# Patient Record
Sex: FEMALE | Race: WHITE | NOT HISPANIC OR LATINO | Employment: FULL TIME | URBAN - METROPOLITAN AREA
[De-identification: names, ages, dates, MRNs, and addresses within clinical notes are randomized per-mention and may not be internally consistent; named-entity substitution may affect disease eponyms.]

---

## 2018-01-09 ENCOUNTER — APPOINTMENT (OUTPATIENT)
Dept: WOMENS IMAGING | Facility: HOSPITAL | Age: 48
End: 2018-01-09

## 2018-01-09 PROCEDURE — 77063 BREAST TOMOSYNTHESIS BI: CPT | Performed by: RADIOLOGY

## 2018-01-09 PROCEDURE — 77067 SCR MAMMO BI INCL CAD: CPT | Performed by: RADIOLOGY

## 2018-06-18 ENCOUNTER — ANESTHESIA EVENT (OUTPATIENT)
Dept: PERIOP | Facility: HOSPITAL | Age: 48
End: 2018-06-18

## 2018-06-18 ENCOUNTER — ANESTHESIA (OUTPATIENT)
Dept: PERIOP | Facility: HOSPITAL | Age: 48
End: 2018-06-18

## 2018-06-18 ENCOUNTER — APPOINTMENT (OUTPATIENT)
Dept: CT IMAGING | Facility: HOSPITAL | Age: 48
End: 2018-06-18

## 2018-06-18 ENCOUNTER — HOSPITAL ENCOUNTER (INPATIENT)
Facility: HOSPITAL | Age: 48
LOS: 2 days | Discharge: HOME | End: 2018-06-20
Attending: EMERGENCY MEDICINE | Admitting: COLON & RECTAL SURGERY

## 2018-06-18 DIAGNOSIS — K61.1 PERIRECTAL ABSCESS: Primary | ICD-10-CM

## 2018-06-18 LAB
ANION GAP SERPL CALCULATED.3IONS-SCNC: 13.7 MMOL/L
BASOPHILS # BLD AUTO: 0.04 10*3/MM3 (ref 0–0.2)
BASOPHILS NFR BLD AUTO: 0.2 % (ref 0–1.5)
BUN BLD-MCNC: 16 MG/DL (ref 6–20)
BUN/CREAT SERPL: 19.5 (ref 7–25)
CALCIUM SPEC-SCNC: 9.4 MG/DL (ref 8.6–10.5)
CHLORIDE SERPL-SCNC: 98 MMOL/L (ref 98–107)
CO2 SERPL-SCNC: 26.3 MMOL/L (ref 22–29)
CREAT BLD-MCNC: 0.82 MG/DL (ref 0.57–1)
DEPRECATED RDW RBC AUTO: 41.9 FL (ref 37–54)
EOSINOPHIL # BLD AUTO: 0.03 10*3/MM3 (ref 0–0.7)
EOSINOPHIL NFR BLD AUTO: 0.2 % (ref 0.3–6.2)
ERYTHROCYTE [DISTWIDTH] IN BLOOD BY AUTOMATED COUNT: 12.3 % (ref 11.7–13)
GFR SERPL CREATININE-BSD FRML MDRD: 74 ML/MIN/1.73
GLUCOSE BLD-MCNC: 119 MG/DL (ref 65–99)
HCG SERPL QL: NEGATIVE
HCT VFR BLD AUTO: 40.5 % (ref 35.6–45.5)
HGB BLD-MCNC: 13.6 G/DL (ref 11.9–15.5)
IMM GRANULOCYTES # BLD: 0.07 10*3/MM3 (ref 0–0.03)
IMM GRANULOCYTES NFR BLD: 0.4 % (ref 0–0.5)
LYMPHOCYTES # BLD AUTO: 1.74 10*3/MM3 (ref 0.9–4.8)
LYMPHOCYTES NFR BLD AUTO: 9.8 % (ref 19.6–45.3)
MCH RBC QN AUTO: 31.1 PG (ref 26.9–32)
MCHC RBC AUTO-ENTMCNC: 33.6 G/DL (ref 32.4–36.3)
MCV RBC AUTO: 92.7 FL (ref 80.5–98.2)
MONOCYTES # BLD AUTO: 1.26 10*3/MM3 (ref 0.2–1.2)
MONOCYTES NFR BLD AUTO: 7.1 % (ref 5–12)
NEUTROPHILS # BLD AUTO: 14.72 10*3/MM3 (ref 1.9–8.1)
NEUTROPHILS NFR BLD AUTO: 82.7 % (ref 42.7–76)
PLATELET # BLD AUTO: 352 10*3/MM3 (ref 140–500)
PMV BLD AUTO: 9.6 FL (ref 6–12)
POTASSIUM BLD-SCNC: 3.7 MMOL/L (ref 3.5–5.2)
RBC # BLD AUTO: 4.37 10*6/MM3 (ref 3.9–5.2)
SODIUM BLD-SCNC: 138 MMOL/L (ref 136–145)
WBC NRBC COR # BLD: 17.79 10*3/MM3 (ref 4.5–10.7)

## 2018-06-18 PROCEDURE — 74177 CT ABD & PELVIS W/CONTRAST: CPT

## 2018-06-18 PROCEDURE — 25010000002 DEXAMETHASONE PER 1 MG: Performed by: ANESTHESIOLOGY

## 2018-06-18 PROCEDURE — 0D9P00Z DRAINAGE OF RECTUM WITH DRAINAGE DEVICE, OPEN APPROACH: ICD-10-PCS | Performed by: COLON & RECTAL SURGERY

## 2018-06-18 PROCEDURE — 87205 SMEAR GRAM STAIN: CPT | Performed by: COLON & RECTAL SURGERY

## 2018-06-18 PROCEDURE — 25010000002 CEFEPIME: Performed by: EMERGENCY MEDICINE

## 2018-06-18 PROCEDURE — 25010000002 ONDANSETRON PER 1 MG: Performed by: ANESTHESIOLOGY

## 2018-06-18 PROCEDURE — C2627 CATH, SUPRAPUBIC/CYSTOSCOPIC: HCPCS | Performed by: COLON & RECTAL SURGERY

## 2018-06-18 PROCEDURE — 25010000002 PHENYLEPHRINE PER 1 ML: Performed by: ANESTHESIOLOGY

## 2018-06-18 PROCEDURE — 87075 CULTR BACTERIA EXCEPT BLOOD: CPT | Performed by: COLON & RECTAL SURGERY

## 2018-06-18 PROCEDURE — 25010000002 HYDROMORPHONE PER 4 MG: Performed by: ANESTHESIOLOGY

## 2018-06-18 PROCEDURE — 80048 BASIC METABOLIC PNL TOTAL CA: CPT | Performed by: EMERGENCY MEDICINE

## 2018-06-18 PROCEDURE — 25010000002 PROPOFOL 10 MG/ML EMULSION: Performed by: ANESTHESIOLOGY

## 2018-06-18 PROCEDURE — 25010000002 FENTANYL CITRATE (PF) 100 MCG/2ML SOLUTION: Performed by: ANESTHESIOLOGY

## 2018-06-18 PROCEDURE — 99253 IP/OBS CNSLTJ NEW/EST LOW 45: CPT | Performed by: COLON & RECTAL SURGERY

## 2018-06-18 PROCEDURE — 25010000002 MIDAZOLAM PER 1 MG: Performed by: ANESTHESIOLOGY

## 2018-06-18 PROCEDURE — 85025 COMPLETE CBC W/AUTO DIFF WBC: CPT | Performed by: EMERGENCY MEDICINE

## 2018-06-18 PROCEDURE — 99283 EMERGENCY DEPT VISIT LOW MDM: CPT

## 2018-06-18 PROCEDURE — 84703 CHORIONIC GONADOTROPIN ASSAY: CPT | Performed by: EMERGENCY MEDICINE

## 2018-06-18 PROCEDURE — 87185 SC STD ENZYME DETCJ PER NZM: CPT | Performed by: COLON & RECTAL SURGERY

## 2018-06-18 PROCEDURE — 46040 I&D ISCHIORCT&/PERIRCT ABSC: CPT | Performed by: COLON & RECTAL SURGERY

## 2018-06-18 PROCEDURE — 87076 CULTURE ANAEROBE IDENT EACH: CPT | Performed by: COLON & RECTAL SURGERY

## 2018-06-18 PROCEDURE — 25010000002 IOPAMIDOL 61 % SOLUTION: Performed by: EMERGENCY MEDICINE

## 2018-06-18 PROCEDURE — 87147 CULTURE TYPE IMMUNOLOGIC: CPT | Performed by: COLON & RECTAL SURGERY

## 2018-06-18 PROCEDURE — 87070 CULTURE OTHR SPECIMN AEROBIC: CPT | Performed by: COLON & RECTAL SURGERY

## 2018-06-18 PROCEDURE — 87186 SC STD MICRODIL/AGAR DIL: CPT | Performed by: COLON & RECTAL SURGERY

## 2018-06-18 PROCEDURE — 25010000002 VANCOMYCIN 10 G RECONSTITUTED SOLUTION: Performed by: EMERGENCY MEDICINE

## 2018-06-18 RX ORDER — DEXAMETHASONE SODIUM PHOSPHATE 10 MG/ML
INJECTION INTRAMUSCULAR; INTRAVENOUS AS NEEDED
Status: DISCONTINUED | OUTPATIENT
Start: 2018-06-18 | End: 2018-06-18 | Stop reason: SURG

## 2018-06-18 RX ORDER — NALOXONE HCL 0.4 MG/ML
0.2 VIAL (ML) INJECTION AS NEEDED
Status: DISCONTINUED | OUTPATIENT
Start: 2018-06-18 | End: 2018-06-18

## 2018-06-18 RX ORDER — ACETAMINOPHEN 325 MG/1
650 TABLET ORAL EVERY 6 HOURS
Status: DISCONTINUED | OUTPATIENT
Start: 2018-06-18 | End: 2018-06-20 | Stop reason: HOSPADM

## 2018-06-18 RX ORDER — LIDOCAINE HYDROCHLORIDE 20 MG/ML
INJECTION, SOLUTION INFILTRATION; PERINEURAL AS NEEDED
Status: DISCONTINUED | OUTPATIENT
Start: 2018-06-18 | End: 2018-06-18 | Stop reason: SURG

## 2018-06-18 RX ORDER — HYDROCODONE BITARTRATE AND ACETAMINOPHEN 7.5; 325 MG/1; MG/1
1 TABLET ORAL ONCE AS NEEDED
Status: DISCONTINUED | OUTPATIENT
Start: 2018-06-18 | End: 2018-06-18

## 2018-06-18 RX ORDER — FENTANYL CITRATE 50 UG/ML
50 INJECTION, SOLUTION INTRAMUSCULAR; INTRAVENOUS
Status: DISCONTINUED | OUTPATIENT
Start: 2018-06-18 | End: 2018-06-18

## 2018-06-18 RX ORDER — FLUTICASONE PROPIONATE 50 MCG
2 SPRAY, SUSPENSION (ML) NASAL DAILY
COMMUNITY

## 2018-06-18 RX ORDER — EPHEDRINE SULFATE 50 MG/ML
5 INJECTION, SOLUTION INTRAVENOUS ONCE AS NEEDED
Status: DISCONTINUED | OUTPATIENT
Start: 2018-06-18 | End: 2018-06-18

## 2018-06-18 RX ORDER — ONDANSETRON 2 MG/ML
INJECTION INTRAMUSCULAR; INTRAVENOUS AS NEEDED
Status: DISCONTINUED | OUTPATIENT
Start: 2018-06-18 | End: 2018-06-18 | Stop reason: SURG

## 2018-06-18 RX ORDER — HYDROMORPHONE HYDROCHLORIDE 1 MG/ML
0.5 INJECTION, SOLUTION INTRAMUSCULAR; INTRAVENOUS; SUBCUTANEOUS
Status: DISCONTINUED | OUTPATIENT
Start: 2018-06-18 | End: 2018-06-18

## 2018-06-18 RX ORDER — CELECOXIB 200 MG/1
200 CAPSULE ORAL EVERY 12 HOURS SCHEDULED
Status: DISCONTINUED | OUTPATIENT
Start: 2018-06-18 | End: 2018-06-20 | Stop reason: HOSPADM

## 2018-06-18 RX ORDER — PROMETHAZINE HYDROCHLORIDE 25 MG/1
25 TABLET ORAL ONCE AS NEEDED
Status: DISCONTINUED | OUTPATIENT
Start: 2018-06-18 | End: 2018-06-18

## 2018-06-18 RX ORDER — PROMETHAZINE HYDROCHLORIDE 25 MG/1
25 SUPPOSITORY RECTAL ONCE AS NEEDED
Status: DISCONTINUED | OUTPATIENT
Start: 2018-06-18 | End: 2018-06-18

## 2018-06-18 RX ORDER — FENTANYL CITRATE 50 UG/ML
50 INJECTION, SOLUTION INTRAMUSCULAR; INTRAVENOUS
Status: DISCONTINUED | OUTPATIENT
Start: 2018-06-18 | End: 2018-06-18 | Stop reason: HOSPADM

## 2018-06-18 RX ORDER — OXYCODONE AND ACETAMINOPHEN 7.5; 325 MG/1; MG/1
1 TABLET ORAL ONCE AS NEEDED
Status: DISCONTINUED | OUTPATIENT
Start: 2018-06-18 | End: 2018-06-18

## 2018-06-18 RX ORDER — LABETALOL HYDROCHLORIDE 5 MG/ML
5 INJECTION, SOLUTION INTRAVENOUS
Status: DISCONTINUED | OUTPATIENT
Start: 2018-06-18 | End: 2018-06-18

## 2018-06-18 RX ORDER — PROMETHAZINE HYDROCHLORIDE 25 MG/ML
12.5 INJECTION, SOLUTION INTRAMUSCULAR; INTRAVENOUS ONCE AS NEEDED
Status: DISCONTINUED | OUTPATIENT
Start: 2018-06-18 | End: 2018-06-18

## 2018-06-18 RX ORDER — ROCURONIUM BROMIDE 10 MG/ML
INJECTION, SOLUTION INTRAVENOUS AS NEEDED
Status: DISCONTINUED | OUTPATIENT
Start: 2018-06-18 | End: 2018-06-18 | Stop reason: SURG

## 2018-06-18 RX ORDER — MONTELUKAST SODIUM 10 MG/1
10 TABLET ORAL EVERY MORNING
COMMUNITY

## 2018-06-18 RX ORDER — PAROXETINE HYDROCHLORIDE 20 MG/1
20 TABLET, FILM COATED ORAL EVERY MORNING
Status: DISCONTINUED | OUTPATIENT
Start: 2018-06-19 | End: 2018-06-20 | Stop reason: HOSPADM

## 2018-06-18 RX ORDER — MELOXICAM 15 MG/1
15 TABLET ORAL DAILY
COMMUNITY
End: 2018-10-21

## 2018-06-18 RX ORDER — SODIUM CHLORIDE 0.9 % (FLUSH) 0.9 %
1-10 SYRINGE (ML) INJECTION AS NEEDED
Status: DISCONTINUED | OUTPATIENT
Start: 2018-06-18 | End: 2018-06-18 | Stop reason: HOSPADM

## 2018-06-18 RX ORDER — MIDAZOLAM HYDROCHLORIDE 1 MG/ML
1 INJECTION INTRAMUSCULAR; INTRAVENOUS
Status: DISCONTINUED | OUTPATIENT
Start: 2018-06-18 | End: 2018-06-18 | Stop reason: HOSPADM

## 2018-06-18 RX ORDER — LISINOPRIL 20 MG/1
10 TABLET ORAL DAILY
Status: DISCONTINUED | OUTPATIENT
Start: 2018-06-19 | End: 2018-06-20 | Stop reason: HOSPADM

## 2018-06-18 RX ORDER — PROPOFOL 10 MG/ML
VIAL (ML) INTRAVENOUS AS NEEDED
Status: DISCONTINUED | OUTPATIENT
Start: 2018-06-18 | End: 2018-06-18 | Stop reason: SURG

## 2018-06-18 RX ORDER — PROMETHAZINE HYDROCHLORIDE 25 MG/1
12.5 TABLET ORAL ONCE AS NEEDED
Status: DISCONTINUED | OUTPATIENT
Start: 2018-06-18 | End: 2018-06-18

## 2018-06-18 RX ORDER — SODIUM CHLORIDE 0.9 % (FLUSH) 0.9 %
10 SYRINGE (ML) INJECTION AS NEEDED
Status: DISCONTINUED | OUTPATIENT
Start: 2018-06-18 | End: 2018-06-18

## 2018-06-18 RX ORDER — FLUMAZENIL 0.1 MG/ML
0.2 INJECTION INTRAVENOUS AS NEEDED
Status: DISCONTINUED | OUTPATIENT
Start: 2018-06-18 | End: 2018-06-18

## 2018-06-18 RX ORDER — LISINOPRIL 20 MG/1
20 TABLET ORAL DAILY
COMMUNITY
End: 2018-06-22 | Stop reason: DRUGHIGH

## 2018-06-18 RX ORDER — MIDAZOLAM HYDROCHLORIDE 1 MG/ML
2 INJECTION INTRAMUSCULAR; INTRAVENOUS
Status: DISCONTINUED | OUTPATIENT
Start: 2018-06-18 | End: 2018-06-18 | Stop reason: HOSPADM

## 2018-06-18 RX ORDER — SODIUM CHLORIDE, SODIUM LACTATE, POTASSIUM CHLORIDE, CALCIUM CHLORIDE 600; 310; 30; 20 MG/100ML; MG/100ML; MG/100ML; MG/100ML
9 INJECTION, SOLUTION INTRAVENOUS CONTINUOUS
Status: DISCONTINUED | OUTPATIENT
Start: 2018-06-18 | End: 2018-06-18

## 2018-06-18 RX ORDER — ONDANSETRON 2 MG/ML
4 INJECTION INTRAMUSCULAR; INTRAVENOUS EVERY 6 HOURS PRN
Status: DISCONTINUED | OUTPATIENT
Start: 2018-06-18 | End: 2018-06-20 | Stop reason: HOSPADM

## 2018-06-18 RX ORDER — OXYCODONE HYDROCHLORIDE 5 MG/1
10 TABLET ORAL EVERY 4 HOURS PRN
Status: DISCONTINUED | OUTPATIENT
Start: 2018-06-18 | End: 2018-06-20 | Stop reason: HOSPADM

## 2018-06-18 RX ORDER — MONTELUKAST SODIUM 10 MG/1
10 TABLET ORAL EVERY MORNING
Status: DISCONTINUED | OUTPATIENT
Start: 2018-06-19 | End: 2018-06-20 | Stop reason: HOSPADM

## 2018-06-18 RX ORDER — FEXOFENADINE HCL AND PSEUDOEPHEDRINE HCI 180; 240 MG/1; MG/1
1 TABLET, EXTENDED RELEASE ORAL DAILY
COMMUNITY
End: 2018-07-26

## 2018-06-18 RX ORDER — DIPHENHYDRAMINE HYDROCHLORIDE 50 MG/ML
12.5 INJECTION INTRAMUSCULAR; INTRAVENOUS
Status: DISCONTINUED | OUTPATIENT
Start: 2018-06-18 | End: 2018-06-18

## 2018-06-18 RX ORDER — MAGNESIUM HYDROXIDE 1200 MG/15ML
LIQUID ORAL AS NEEDED
Status: DISCONTINUED | OUTPATIENT
Start: 2018-06-18 | End: 2018-06-18 | Stop reason: HOSPADM

## 2018-06-18 RX ORDER — FAMOTIDINE 10 MG/ML
20 INJECTION, SOLUTION INTRAVENOUS ONCE
Status: COMPLETED | OUTPATIENT
Start: 2018-06-18 | End: 2018-06-18

## 2018-06-18 RX ORDER — NITROGLYCERIN 0.4 MG/1
0.4 TABLET SUBLINGUAL
Status: DISCONTINUED | OUTPATIENT
Start: 2018-06-18 | End: 2018-06-20 | Stop reason: HOSPADM

## 2018-06-18 RX ORDER — SODIUM CHLORIDE, SODIUM LACTATE, POTASSIUM CHLORIDE, CALCIUM CHLORIDE 600; 310; 30; 20 MG/100ML; MG/100ML; MG/100ML; MG/100ML
50 INJECTION, SOLUTION INTRAVENOUS CONTINUOUS
Status: DISCONTINUED | OUTPATIENT
Start: 2018-06-18 | End: 2018-06-20 | Stop reason: HOSPADM

## 2018-06-18 RX ORDER — LIDOCAINE HYDROCHLORIDE 10 MG/ML
0.5 INJECTION, SOLUTION EPIDURAL; INFILTRATION; INTRACAUDAL; PERINEURAL ONCE AS NEEDED
Status: DISCONTINUED | OUTPATIENT
Start: 2018-06-18 | End: 2018-06-18 | Stop reason: HOSPADM

## 2018-06-18 RX ORDER — ONDANSETRON 2 MG/ML
4 INJECTION INTRAMUSCULAR; INTRAVENOUS ONCE AS NEEDED
Status: DISCONTINUED | OUTPATIENT
Start: 2018-06-18 | End: 2018-06-18

## 2018-06-18 RX ORDER — PAROXETINE HYDROCHLORIDE 20 MG/1
20 TABLET, FILM COATED ORAL EVERY MORNING
COMMUNITY

## 2018-06-18 RX ORDER — LIDOCAINE HYDROCHLORIDE 40 MG/ML
SOLUTION TOPICAL AS NEEDED
Status: DISCONTINUED | OUTPATIENT
Start: 2018-06-18 | End: 2018-06-18 | Stop reason: SURG

## 2018-06-18 RX ORDER — OXYCODONE HYDROCHLORIDE 5 MG/1
5 TABLET ORAL EVERY 4 HOURS PRN
Status: DISCONTINUED | OUTPATIENT
Start: 2018-06-18 | End: 2018-06-20 | Stop reason: HOSPADM

## 2018-06-18 RX ORDER — NALOXONE HCL 0.4 MG/ML
0.4 VIAL (ML) INJECTION
Status: DISCONTINUED | OUTPATIENT
Start: 2018-06-18 | End: 2018-06-20 | Stop reason: HOSPADM

## 2018-06-18 RX ORDER — ACETAMINOPHEN 650 MG
TABLET, EXTENDED RELEASE ORAL AS NEEDED
Status: DISCONTINUED | OUTPATIENT
Start: 2018-06-18 | End: 2018-06-18 | Stop reason: HOSPADM

## 2018-06-18 RX ADMIN — HYDROMORPHONE HYDROCHLORIDE 0.5 MG: 1 INJECTION, SOLUTION INTRAMUSCULAR; INTRAVENOUS; SUBCUTANEOUS at 17:52

## 2018-06-18 RX ADMIN — FENTANYL CITRATE 50 MCG: 50 INJECTION INTRAMUSCULAR; INTRAVENOUS at 15:52

## 2018-06-18 RX ADMIN — METRONIDAZOLE 500 MG: 500 INJECTION, SOLUTION INTRAVENOUS at 15:32

## 2018-06-18 RX ADMIN — IOPAMIDOL 85 ML: 612 INJECTION, SOLUTION INTRAVENOUS at 13:32

## 2018-06-18 RX ADMIN — FAMOTIDINE 20 MG: 10 INJECTION, SOLUTION INTRAVENOUS at 15:52

## 2018-06-18 RX ADMIN — CEFEPIME HYDROCHLORIDE 2 G: 2 INJECTION, POWDER, FOR SOLUTION INTRAVENOUS at 23:04

## 2018-06-18 RX ADMIN — PROPOFOL 200 MG: 10 INJECTION, EMULSION INTRAVENOUS at 16:14

## 2018-06-18 RX ADMIN — SODIUM CHLORIDE, POTASSIUM CHLORIDE, SODIUM LACTATE AND CALCIUM CHLORIDE 9 ML/HR: 600; 310; 30; 20 INJECTION, SOLUTION INTRAVENOUS at 15:53

## 2018-06-18 RX ADMIN — ACETAMINOPHEN 650 MG: 325 TABLET, FILM COATED ORAL at 23:03

## 2018-06-18 RX ADMIN — CEFEPIME 2 G: 2 INJECTION, POWDER, FOR SOLUTION INTRAVENOUS at 16:14

## 2018-06-18 RX ADMIN — PHENYLEPHRINE HYDROCHLORIDE 100 MCG: 10 INJECTION INTRAVENOUS at 16:47

## 2018-06-18 RX ADMIN — ONDANSETRON 4 MG: 2 INJECTION INTRAMUSCULAR; INTRAVENOUS at 16:47

## 2018-06-18 RX ADMIN — SODIUM CHLORIDE, POTASSIUM CHLORIDE, SODIUM LACTATE AND CALCIUM CHLORIDE: 600; 310; 30; 20 INJECTION, SOLUTION INTRAVENOUS at 16:05

## 2018-06-18 RX ADMIN — LIDOCAINE HYDROCHLORIDE 1 EACH: 40 SOLUTION TOPICAL at 16:18

## 2018-06-18 RX ADMIN — FENTANYL CITRATE 100 MCG: 50 INJECTION INTRAMUSCULAR; INTRAVENOUS at 16:14

## 2018-06-18 RX ADMIN — OXYCODONE HYDROCHLORIDE 5 MG: 5 TABLET ORAL at 22:30

## 2018-06-18 RX ADMIN — SODIUM CHLORIDE, POTASSIUM CHLORIDE, SODIUM LACTATE AND CALCIUM CHLORIDE 50 ML/HR: 600; 310; 30; 20 INJECTION, SOLUTION INTRAVENOUS at 18:55

## 2018-06-18 RX ADMIN — ROCURONIUM BROMIDE 50 MG: 10 INJECTION INTRAVENOUS at 16:14

## 2018-06-18 RX ADMIN — FENTANYL CITRATE 50 MCG: 50 INJECTION INTRAMUSCULAR; INTRAVENOUS at 17:12

## 2018-06-18 RX ADMIN — VANCOMYCIN HYDROCHLORIDE 1750 MG: 10 INJECTION, POWDER, LYOPHILIZED, FOR SOLUTION INTRAVENOUS at 15:03

## 2018-06-18 RX ADMIN — METRONIDAZOLE 500 MG: 500 INJECTION, SOLUTION INTRAVENOUS at 23:03

## 2018-06-18 RX ADMIN — PHENYLEPHRINE HYDROCHLORIDE 100 MCG: 10 INJECTION INTRAVENOUS at 16:28

## 2018-06-18 RX ADMIN — LIDOCAINE HYDROCHLORIDE 50 MG: 20 INJECTION, SOLUTION INFILTRATION; PERINEURAL at 16:14

## 2018-06-18 RX ADMIN — SUGAMMADEX 500 MG: 100 INJECTION, SOLUTION INTRAVENOUS at 16:47

## 2018-06-18 RX ADMIN — MIDAZOLAM 2 MG: 1 INJECTION INTRAMUSCULAR; INTRAVENOUS at 15:53

## 2018-06-18 RX ADMIN — DEXAMETHASONE SODIUM PHOSPHATE 8 MG: 10 INJECTION INTRAMUSCULAR; INTRAVENOUS at 16:35

## 2018-06-18 NOTE — ED PROVIDER NOTES
EMERGENCY DEPARTMENT ENCOUNTER    CHIEF COMPLAINT  Chief Complaint: rectal pain  History given by: pt  History limited by: nothing  Room Number: 430/1  PMD: Nicolas Cool MD      HPI:  Pt is a 48 y.o. female with h/o hemorrhoids who presents complaining of rectal pain that began three weeks ago and worsened yesterday night. Pt also complains of small rectal bleeding [that is baseline per the patient] but denies fever, chills, or abdominal pain. There are no other complaints at this time.    Duration: began three weeks ago  Onset: gradual  Timing: constant  Location: rectum  Quality: pain  Intensity/Severity: moderate  Progression: gradually worsened  Associated Symptoms: small rectal bleeding [that is baseline per the patient]    PAST MEDICAL HISTORY  Active Ambulatory Problems     Diagnosis Date Noted   • No Active Ambulatory Problems     Resolved Ambulatory Problems     Diagnosis Date Noted   • No Resolved Ambulatory Problems     Past Medical History:   Diagnosis Date   • Hypertension        PAST SURGICAL HISTORY  Past Surgical History:   Procedure Laterality Date   • TONSILLECTOMY         FAMILY HISTORY  History reviewed. No pertinent family history.    SOCIAL HISTORY  Social History     Social History   • Marital status: Unknown     Spouse name: N/A   • Number of children: N/A   • Years of education: N/A     Occupational History   • Not on file.     Social History Main Topics   • Smoking status: Never Smoker   • Smokeless tobacco: Never Used   • Alcohol use Yes      Comment: small    • Drug use: No   • Sexual activity: Not on file     Other Topics Concern   • Not on file     Social History Narrative   • No narrative on file       ALLERGIES  Penicillins and Sulfa antibiotics    REVIEW OF SYSTEMS  Review of Systems   Constitutional: Negative for fever.   HENT: Negative for sore throat.    Eyes: Negative.    Respiratory: Negative for cough and shortness of breath.    Cardiovascular: Negative for chest pain.    Gastrointestinal: Positive for anal bleeding and rectal pain. Negative for abdominal pain and diarrhea.   Genitourinary: Negative for dysuria.   Musculoskeletal: Negative for neck pain.   Skin: Negative for rash.   Allergic/Immunologic: Negative.    Neurological: Negative for weakness, numbness and headaches.   Hematological: Negative.    Psychiatric/Behavioral: Negative.    All other systems reviewed and are negative.      PHYSICAL EXAM  ED Triage Vitals   Temp Heart Rate Resp BP SpO2   06/18/18 0747 06/18/18 0742 06/18/18 0742 06/18/18 0742 06/18/18 0741   98.7 °F (37.1 °C) (!) 121 18 135/89 99 %      Temp src Heart Rate Source Patient Position BP Location FiO2 (%)   06/18/18 0747 06/18/18 0742 -- -- --   Tympanic Monitor          Physical Exam   Constitutional: She is oriented to person, place, and time. No distress.   HENT:   Head: Normocephalic and atraumatic.   Eyes: EOM are normal. Pupils are equal, round, and reactive to light.   Neck: Normal range of motion. Neck supple.   Cardiovascular: Normal rate, regular rhythm and normal heart sounds.    Pulmonary/Chest: Effort normal and breath sounds normal. No respiratory distress.   Abdominal: Soft. There is no tenderness. There is no rebound and no guarding.   Genitourinary: Rectal exam shows external hemorrhoid (two; small, non-tender and not thrombosed.).   Genitourinary Comments: There was an area that was tender and indurated to the left of the perineal area.   Musculoskeletal: Normal range of motion. She exhibits no edema.   Neurological: She is alert and oriented to person, place, and time. She has normal sensation and normal strength.   Skin: Skin is warm and dry. No rash noted.   Psychiatric: Mood and affect normal.   Nursing note and vitals reviewed.      LAB RESULTS  Lab Results (last 24 hours)     Procedure Component Value Units Date/Time    CBC & Differential [[id number]] Collected:  06/18/18 1221    Specimen:  Blood Updated:  06/18/18 1237     Narrative:       The following orders were created for panel order CBC & Differential.  Procedure                               Abnormality         Status                     ---------                               -----------         ------                     CBC Auto Differential[[id number]]        Abnormal            Final result                 Please view results for these tests on the individual orders.    Basic Metabolic Panel [[id number]]  (Abnormal) Collected:  06/18/18 1221    Specimen:  Blood Updated:  06/18/18 1259     Glucose 119 (H) mg/dL      BUN 16 mg/dL      Creatinine 0.82 mg/dL      Sodium 138 mmol/L      Potassium 3.7 mmol/L      Chloride 98 mmol/L      CO2 26.3 mmol/L      Calcium 9.4 mg/dL      eGFR Non African Amer 74 mL/min/1.73      BUN/Creatinine Ratio 19.5     Anion Gap 13.7 mmol/L     Narrative:       GFR Normal >60  Chronic Kidney Disease <60  Kidney Failure <15    hCG, Serum, Qualitative [[id number]]  (Normal) Collected:  06/18/18 1221    Specimen:  Blood Updated:  06/18/18 1310     HCG Qualitative Negative    CBC Auto Differential [[id number]]  (Abnormal) Collected:  06/18/18 1221    Specimen:  Blood Updated:  06/18/18 1237     WBC 17.79 (H) 10*3/mm3      RBC 4.37 10*6/mm3      Hemoglobin 13.6 g/dL      Hematocrit 40.5 %      MCV 92.7 fL      MCH 31.1 pg      MCHC 33.6 g/dL      RDW 12.3 %      RDW-SD 41.9 fl      MPV 9.6 fL      Platelets 352 10*3/mm3      Neutrophil % 82.7 (H) %      Lymphocyte % 9.8 (L) %      Monocyte % 7.1 %      Eosinophil % 0.2 (L) %      Basophil % 0.2 %      Immature Grans % 0.4 %      Neutrophils, Absolute 14.72 (H) 10*3/mm3      Lymphocytes, Absolute 1.74 10*3/mm3      Monocytes, Absolute 1.26 (H) 10*3/mm3      Eosinophils, Absolute 0.03 10*3/mm3      Basophils, Absolute 0.04 10*3/mm3      Immature Grans, Absolute 0.07 (H) 10*3/mm3     Anaerobic Culture - Wound, Per Rectum [[id number]] Collected:  06/18/18 1629    Specimen:  Wound from Per Rectum Updated:   06/18/18 1636    Wound Culture - Wound, Per Rectum [[id number]] Collected:  06/18/18 1629    Specimen:  Wound from Per Rectum Updated:  06/18/18 1636          I ordered the above labs and reviewed the results    RADIOLOGY  CT Abdomen Pelvis With Contrast   Final Result   1. Rectal abscess measuring up to 6.0 cm in size.   2. A 3.7 cm right adnexal cyst.       Radiation dose reduction techniques were utilized, including automated   exposure control and exposure modulation based on body size.       This report was finalized on 6/18/2018 2:38 PM by Dr. Larry Pino M.D.               I ordered the above noted radiological studies. Interpreted by radiologist. Reviewed by me in PACS.       PROCEDURES  Procedures      PROGRESS AND CONSULTS  1204 Performed rectal exam with female chaperone.    1209 Ordered CT Abd/Pelvis and blood work for further evaluation. Also ordered IVF for hydration.    1415 Received a call from Dr. Pino, radiology, who stated that the CT Abd/Pelvis showed to the left perianal area that there is an area of inflammation with some bubbles of air that measure 6 cm. There is no discrete abscess.    1420 Placed call to Surgery.    1440 Rechecked pt who is in NAD. Discussed with pt the CT Abd/Pelvis findings that show an area of inflammation but no abscess. Also discussed the plan to consult Dr. Fitzgerald for further evaluation. Pt understands and agrees with the plan, all questions answered.    1443 Received a call from Dr. Stacie Fitzgerald, surgery, and discussed pt's case. Dr. Fitzgerald reviewed the CT Abd/Pelvis and stated that she will take the pt to the OR. She advised that the pt should be given Zosyn and Vancomycin in the ED.    1445 Rechecked pt who is in NAD. Discussed with pt the plan to treat with antibiotics before an emergent operation, per Dr. Fitzgerald's request. Pt understands and agrees with the plan, all questions answered.    1447 Discussed with ER pharmacist that Dr. Fitzgerald advised that the  pt be treated with Zosyn and Vancomycin. However, since the pt is allergic to penicillins, she advised that Cefepime and Flagyl should be ordered.    1448 Ordered Cefepime and Flagyl to treat infection.    MEDICAL DECISION MAKING  Results were reviewed/discussed with the patient and they were also made aware of online access. Pt also made aware that some labs, such as cultures, will not be resulted during ER visit and follow up with PMD is necessary.     MDM  Number of Diagnoses or Management Options  Diagnosis management comments: Patient's white blood cell count was elevated.  CT scan showed a perirectal abscess.  Case was discussed with Dr. Fitzgerald.  Dr. Fitzgerald evaluated the patient in the emergency department and is going to take her to the operating room.  IV Flagyl, cefepime, and vancomycin were ordered while the patient was in the ER..       Amount and/or Complexity of Data Reviewed  Clinical lab tests: ordered and reviewed (CBC WBC - 17.79)  Tests in the radiology section of CPT®: ordered and reviewed (The CT Abd/Pelvis showed to the left perianal area that there is an area of inflammation with some bubbles of air that measure 6 cm. There is no discrete abscess.)  Discussion of test results with the performing providers: yes (Dr. Pino, radiology)  Decide to obtain previous medical records or to obtain history from someone other than the patient: yes  Review and summarize past medical records: yes (The patient has no pertinent previous records in Epic.)  Discuss the patient with other providers: yes (Dr. Stacie Fitzgerald, surgery, and ER Pharmacist)  Independent visualization of images, tracings, or specimens: yes    Patient Progress  Patient progress: stable         DIAGNOSIS  Final diagnoses:   Perirectal abscess       DISPOSITION  SEND TO OR.    Latest Documented Vital Signs:  As of 8:38 PM  BP- 123/81 HR- 74 Temp- 97.9 °F (36.6 °C) (Oral) O2 sat- 97%    --  Documentation assistance provided by didi Santa  Adin for Dr. Butler.  Information recorded by the scribe was done at my direction and has been verified and validated by me.       Arina Oakley  06/18/18 1454       Calvin Butler MD  06/18/18 2032

## 2018-06-18 NOTE — ANESTHESIA PREPROCEDURE EVALUATION
Anesthesia Evaluation     Patient summary reviewed and Nursing notes reviewed   no history of anesthetic complications:  NPO Solid Status: > 8 hours  NPO Liquid Status: > 8 hours           Airway   Mallampati: III  TM distance: <3 FB  Neck ROM: full  Possible difficult intubation  Dental - normal exam         Pulmonary - normal exam   (+) asthma,   Cardiovascular - normal exam    (+) hypertension well controlled,       Neuro/Psych  GI/Hepatic/Renal/Endo      Musculoskeletal     Abdominal  - normal exam   Substance History      OB/GYN          Other                      Anesthesia Plan    ASA 2 - emergent     general   (Will have CMAC in OR)  intravenous induction   Anesthetic plan and risks discussed with patient.

## 2018-06-18 NOTE — ANESTHESIA PROCEDURE NOTES
Airway  Urgency: elective    Airway not difficult    General Information and Staff    Patient location during procedure: OR  Anesthesiologist: MAGGI PERALTA    Indications and Patient Condition  Indications for airway management: airway protection    Preoxygenated: yes  MILS maintained throughout  Mask difficulty assessment: 1 - vent by mask    Final Airway Details  Final airway type: endotracheal airway      Successful airway: ETT  Cuffed: yes   Successful intubation technique: direct laryngoscopy  Facilitating devices/methods: anterior pressure/BURP and intubating stylet  Endotracheal tube insertion site: oral  Blade: Jo  Blade size: #3  ETT size: 7.0 mm  Cormack-Lehane Classification: grade IIb - view of arytenoids or posterior of glottis only  Placement verified by: chest auscultation and capnometry   Measured from: lips  ETT to lips (cm): 21  Number of attempts at approach: 2    Additional Comments  Eschmann needed

## 2018-06-18 NOTE — OP NOTE
06/18/18    Surgeon: Stacie Fitzgerald MD    Surgical  Assistant: Gina Spaulding PA-C     Preoperative diagnosis: Perirectal abscess [K61.1]    Post-Op Diagnosis Codes:     * Perirectal abscess [K61.1]    Procedure: Incision and Drainage of Perirectal Abscess with drain placement, * Panel 2 does not exist *    Estimated Blood Loss: minimal    Specimens:   ID Type Source Tests Collected by Time   1 : Culture swab from perirectal abscess Wound Per Rectum ANAEROBIC CULTURE, WOUND CULTURE Stacie Fitzgerald MD 6/18/2018 1629       Indication:  Chante Harrell is a 48 y.o. female who comes in with Perirectal abscess  Patient understands risks, benefits,and alternatives wishes to proceed.      Procedure Details:  DESCRIPTION OF PROCEDURE: The patient was brought into the operating room, SCDs in place, antibiotics having been infused in the emergency room and continued. After adequate anesthesia was achieved the patient was placed in candy cane lithotomy and prepped and draped in sterile fashion. On exam anteriorly was a significant abscess on the right and the left of midline tracking in the rectovaginal septum on the left side. Going laterally there was a large area of fluctuance with a significant amount of induration going out 10 cm. The area of induration started at about 2 cm from the verge. On inspecting the anal canal with the lighted Hill-Hayes the mucosa is nice and pink. There is normal-color stool in the vault. When putting pressure on the abscess cavity there was no evidence of fistula or any extravasation into the rectum. So I made an incision in the left anterior position, getting into the cavity, and took a culture for gram stain and aerobic and anaerobic and sent that off. This cavity tracked across the midline and then laterally to the left posterior space. I made a counterincision on the right anterior side and another on the left lateral side. I put a Pezzer in the left lateral one, a 16-Kenyan, and  sutured it in. I put a 5/8-inch Penrose in the anterior one so this would all be adequately drained. I put 1% lidocaine and 0.25% Marcaine plain and put some local infiltration to help with pain control postoperatively. All instrument, lap counts, and needle counts were correct. The patient was stable throughout the entire case.

## 2018-06-18 NOTE — ANESTHESIA POSTPROCEDURE EVALUATION
Patient: Chante Harrell    Procedure Summary     Date:  06/18/18 Room / Location:  [facility] OR  / [facility] MAIN OR    Anesthesia Start:  1605 Anesthesia Stop:  1702    Procedure:  Incision and Drainage of Perirectal Abscess with drain placement (N/A Rectum) Diagnosis:       Perirectal abscess      (Perirectal abscess [K61.1])    Surgeon:  Stacie Fitzgerald MD Provider:  Denny Mcclain MD    Anesthesia Type:  general ASA Status:  2 - Emergent          Anesthesia Type: general  Last vitals  BP   123/77 (06/18/18 1715)   Temp   37.3 °C (99.2 °F) (06/18/18 1658)   Pulse   91 (06/18/18 1715)   Resp   18 (06/18/18 1715)     SpO2   99 % (06/18/18 1715)     Post Anesthesia Care and Evaluation    Patient location during evaluation: PACU  Patient participation: complete - patient participated  Level of consciousness: awake and alert  Pain management: adequate  Airway patency: patent  Anesthetic complications: No anesthetic complications  PONV Status: none  Cardiovascular status: acceptable  Respiratory status: acceptable  Hydration status: acceptable

## 2018-06-18 NOTE — ED TRIAGE NOTES
"Pt reports battling hemorroids x3 weeks, states woke up today and \"it's so swollen and deformed.\"  "

## 2018-06-18 NOTE — CONSULTS
Chante Harrell is a 48 y.o. female who is seen at the request of Dr. Butler for Rectal Pain (pain and swelling) and Abscess (perirectal).      HPI:pt c/o anal pain that kept her awake last night.  She states that the pain started 3 wks ago.  She thought it was hemorrhoids and tried otc suppository. She denies fever, nausea, or vomitting.  She states that 3 wks ago was constipated and strained a little and that is why thought hemorrhoids. Denies drainage pr      Past Medical History:   Diagnosis Date   • Hypertension    oa  depression    Psh:  t and a      Social History:   reports that she has never smoked. She has never used smokeless tobacco. She reports that she drinks alcohol. She reports that she does not use drugs.      Marriage status: divorce      Family history :  Colon ca- no  Positive for :Dm, Cad, cva ,Blood clots    Current Facility-Administered Medications:   •  cefepime (MAXIPIME) 2 g/100 mL 0.9% NS (mbp), 2 g, Intravenous, Once, Calvin Butler MD  •  metroNIDAZOLE (FLAGYL) IVPB 500 mg, 500 mg, Intravenous, Once, Calvin Butler MD  •  sodium chloride 0.9 % bolus 1,000 mL, 1,000 mL, Intravenous, Once, Calvin Butler MD  •  Insert peripheral IV, , , Once **AND** sodium chloride 0.9 % flush 10 mL, 10 mL, Intravenous, PRN, Calvin Butler MD  •  vancomycin 1750 mg/500 mL 0.9% NS IVPB ([facility]), 20 mg/kg, Intravenous, Once, Calvin Butler MD  No current outpatient prescriptions on file.    Allergy  Penicillins and Sulfa antibiotics    Review of Systems   Constitution: Negative for chills, fever and weakness.   HENT: Negative for congestion, hearing loss and hoarse voice.    Eyes: Negative for blurred vision, discharge and visual disturbance.   Cardiovascular: Negative for chest pain, cyanosis and leg swelling.   Respiratory: Negative for cough, shortness of breath, sleep disturbances due to breathing and snoring.    Endocrine: Negative for cold intolerance and heat intolerance.    Hematologic/Lymphatic: Does not bruise/bleed easily.   Skin: Negative for itching, poor wound healing and skin cancer.   Musculoskeletal: Negative for arthritis, back pain, joint pain and joint swelling.   Gastrointestinal: Negative for abdominal pain, change in bowel habit and bowel incontinence.   Genitourinary: Negative for bladder incontinence, dysuria and hematuria.   Neurological: Negative for brief paralysis, excessive daytime sleepiness, dizziness, focal weakness, headaches and light-headedness.   Psychiatric/Behavioral: Negative for altered mental status and hallucinations. The patient does not have insomnia.    Allergic/Immunologic: Negative for HIV exposure and persistent infections.       Vitals:    06/18/18 1233   BP: 124/84   Pulse:    Resp:    Temp:    SpO2:      Body mass index is 29.35 kg/m².    Physical Exam   Constitutional: She is oriented to person, place, and time. She appears well-developed and well-nourished. No distress.   HENT:   Head: Normocephalic and atraumatic.   Nose: Nose normal.   Mouth/Throat: Oropharynx is clear and moist.   Eyes: Conjunctivae and EOM are normal. Pupils are equal, round, and reactive to light.   Neck: Normal range of motion. No tracheal deviation present.   Pulmonary/Chest: Effort normal and breath sounds normal. No respiratory distress.   Abdominal: Soft. Bowel sounds are normal. She exhibits no distension.   Musculoskeletal: Normal range of motion. She exhibits no edema or deformity.   Neurological: She is alert and oriented to person, place, and time. No cranial nerve deficit. Coordination and gait normal.   Skin: Skin is warm and dry.   Psychiatric: She has a normal mood and affect. Her behavior is normal. Judgment normal.       Review of Medical Record:  I reviewed ct scan - air and large perirectal abscess    Assessment:  1. Perirectal abscess        Plan:  Concerned for du gangrene with the the incredible amount of air.    To the or for drainage and  possible diverting colostomy

## 2018-06-19 LAB
ANION GAP SERPL CALCULATED.3IONS-SCNC: 10.9 MMOL/L
APTT PPP: 32.1 SECONDS (ref 22.7–35.4)
BASOPHILS # BLD AUTO: 0.01 10*3/MM3 (ref 0–0.2)
BASOPHILS NFR BLD AUTO: 0.1 % (ref 0–1.5)
BUN BLD-MCNC: 10 MG/DL (ref 6–20)
BUN/CREAT SERPL: 15.2 (ref 7–25)
CALCIUM SPEC-SCNC: 9.2 MG/DL (ref 8.6–10.5)
CHLORIDE SERPL-SCNC: 102 MMOL/L (ref 98–107)
CO2 SERPL-SCNC: 25.1 MMOL/L (ref 22–29)
CREAT BLD-MCNC: 0.66 MG/DL (ref 0.57–1)
DEPRECATED RDW RBC AUTO: 42.8 FL (ref 37–54)
EOSINOPHIL # BLD AUTO: 0 10*3/MM3 (ref 0–0.7)
EOSINOPHIL NFR BLD AUTO: 0 % (ref 0.3–6.2)
ERYTHROCYTE [DISTWIDTH] IN BLOOD BY AUTOMATED COUNT: 12.4 % (ref 11.7–13)
GFR SERPL CREATININE-BSD FRML MDRD: 96 ML/MIN/1.73
GLUCOSE BLD-MCNC: 146 MG/DL (ref 65–99)
HCT VFR BLD AUTO: 40.8 % (ref 35.6–45.5)
HGB BLD-MCNC: 13 G/DL (ref 11.9–15.5)
IMM GRANULOCYTES # BLD: 0.03 10*3/MM3 (ref 0–0.03)
IMM GRANULOCYTES NFR BLD: 0.2 % (ref 0–0.5)
INR PPP: 1.16 (ref 0.9–1.1)
LYMPHOCYTES # BLD AUTO: 0.73 10*3/MM3 (ref 0.9–4.8)
LYMPHOCYTES NFR BLD AUTO: 5 % (ref 19.6–45.3)
MAGNESIUM SERPL-MCNC: 2.5 MG/DL (ref 1.6–2.6)
MCH RBC QN AUTO: 30.6 PG (ref 26.9–32)
MCHC RBC AUTO-ENTMCNC: 31.9 G/DL (ref 32.4–36.3)
MCV RBC AUTO: 96 FL (ref 80.5–98.2)
MONOCYTES # BLD AUTO: 0.34 10*3/MM3 (ref 0.2–1.2)
MONOCYTES NFR BLD AUTO: 2.3 % (ref 5–12)
NEUTROPHILS # BLD AUTO: 13.5 10*3/MM3 (ref 1.9–8.1)
NEUTROPHILS NFR BLD AUTO: 92.4 % (ref 42.7–76)
PLATELET # BLD AUTO: 328 10*3/MM3 (ref 140–500)
PMV BLD AUTO: 9.9 FL (ref 6–12)
POTASSIUM BLD-SCNC: 4.4 MMOL/L (ref 3.5–5.2)
PROTHROMBIN TIME: 14.6 SECONDS (ref 11.7–14.2)
RBC # BLD AUTO: 4.25 10*6/MM3 (ref 3.9–5.2)
SODIUM BLD-SCNC: 138 MMOL/L (ref 136–145)
WBC NRBC COR # BLD: 14.61 10*3/MM3 (ref 4.5–10.7)

## 2018-06-19 PROCEDURE — 80048 BASIC METABOLIC PNL TOTAL CA: CPT | Performed by: COLON & RECTAL SURGERY

## 2018-06-19 PROCEDURE — 83735 ASSAY OF MAGNESIUM: CPT | Performed by: COLON & RECTAL SURGERY

## 2018-06-19 PROCEDURE — 94799 UNLISTED PULMONARY SVC/PX: CPT

## 2018-06-19 PROCEDURE — 85610 PROTHROMBIN TIME: CPT | Performed by: COLON & RECTAL SURGERY

## 2018-06-19 PROCEDURE — 85025 COMPLETE CBC W/AUTO DIFF WBC: CPT | Performed by: COLON & RECTAL SURGERY

## 2018-06-19 PROCEDURE — 85730 THROMBOPLASTIN TIME PARTIAL: CPT | Performed by: COLON & RECTAL SURGERY

## 2018-06-19 PROCEDURE — 25010000002 CEFEPIME PER 500 MG: Performed by: COLON & RECTAL SURGERY

## 2018-06-19 RX ADMIN — CELECOXIB 200 MG: 200 CAPSULE ORAL at 21:33

## 2018-06-19 RX ADMIN — METRONIDAZOLE 500 MG: 500 INJECTION, SOLUTION INTRAVENOUS at 16:56

## 2018-06-19 RX ADMIN — POLYETHYLENE GLYCOL 3350 17 G: 17 POWDER, FOR SOLUTION ORAL at 08:27

## 2018-06-19 RX ADMIN — CELECOXIB 200 MG: 200 CAPSULE ORAL at 08:23

## 2018-06-19 RX ADMIN — METRONIDAZOLE 500 MG: 500 INJECTION, SOLUTION INTRAVENOUS at 23:56

## 2018-06-19 RX ADMIN — CEFEPIME HYDROCHLORIDE 2 G: 2 INJECTION, POWDER, FOR SOLUTION INTRAVENOUS at 16:56

## 2018-06-19 RX ADMIN — ACETAMINOPHEN 650 MG: 325 TABLET, FILM COATED ORAL at 05:54

## 2018-06-19 RX ADMIN — SODIUM CHLORIDE, POTASSIUM CHLORIDE, SODIUM LACTATE AND CALCIUM CHLORIDE 50 ML/HR: 600; 310; 30; 20 INJECTION, SOLUTION INTRAVENOUS at 21:33

## 2018-06-19 RX ADMIN — CEFEPIME HYDROCHLORIDE 2 G: 2 INJECTION, POWDER, FOR SOLUTION INTRAVENOUS at 07:45

## 2018-06-19 RX ADMIN — PAROXETINE HYDROCHLORIDE 20 MG: 20 TABLET, FILM COATED ORAL at 06:02

## 2018-06-19 RX ADMIN — ACETAMINOPHEN 650 MG: 325 TABLET, FILM COATED ORAL at 23:56

## 2018-06-19 RX ADMIN — MONTELUKAST 10 MG: 10 TABLET, FILM COATED ORAL at 06:02

## 2018-06-19 RX ADMIN — LISINOPRIL 10 MG: 20 TABLET ORAL at 08:23

## 2018-06-19 RX ADMIN — CEFEPIME HYDROCHLORIDE 2 G: 2 INJECTION, POWDER, FOR SOLUTION INTRAVENOUS at 23:56

## 2018-06-19 RX ADMIN — METRONIDAZOLE 500 MG: 500 INJECTION, SOLUTION INTRAVENOUS at 07:45

## 2018-06-19 RX ADMIN — OXYCODONE HYDROCHLORIDE 5 MG: 5 TABLET ORAL at 17:07

## 2018-06-19 RX ADMIN — ACETAMINOPHEN 650 MG: 325 TABLET, FILM COATED ORAL at 11:08

## 2018-06-19 NOTE — PLAN OF CARE
Problem: Patient Care Overview  Goal: Plan of Care Review  Outcome: Ongoing (interventions implemented as appropriate)   06/19/18 0635   Coping/Psychosocial   Plan of Care Reviewed With patient   Plan of Care Review   Progress improving   OTHER   Outcome Summary VSS. RESTED WELL OVERNIGHT, REQUIRED PO PAIN MED X 1.        Problem: Pain, Acute (Adult)  Goal: Identify Related Risk Factors and Signs and Symptoms  Outcome: Outcome(s) achieved Date Met: 06/19/18    Goal: Acceptable Pain Control/Comfort Level  Outcome: Ongoing (interventions implemented as appropriate)      Problem: Infection, Risk/Actual (Adult)  Goal: Identify Related Risk Factors and Signs and Symptoms  Outcome: Outcome(s) achieved Date Met: 06/19/18    Goal: Infection Prevention/Resolution  Outcome: Ongoing (interventions implemented as appropriate)

## 2018-06-19 NOTE — PLAN OF CARE
Problem: Patient Care Overview  Goal: Plan of Care Review  Outcome: Ongoing (interventions implemented as appropriate)   06/19/18 1908   Coping/Psychosocial   Plan of Care Reviewed With patient   Plan of Care Review   Progress improving   OTHER   Outcome Summary Pt up ad anju, took several walks, medicated for pain prn, safety maintained.       Problem: Pain, Acute (Adult)  Goal: Acceptable Pain Control/Comfort Level  Outcome: Ongoing (interventions implemented as appropriate)      Problem: Infection, Risk/Actual (Adult)  Goal: Infection Prevention/Resolution  Outcome: Ongoing (interventions implemented as appropriate)

## 2018-06-19 NOTE — PAYOR COMM NOTE
"Purnima Ken  (48 y.o. Female)     ATTN: MIGUELITO WALLACEHY  REF#[id number]  [facility]. PLEASE REPLY TO VALERY SUAZO AT FAX#[phone] OR PHONE#[phone]. THANK YOU.       Date of Birth Social Security Number Address Home Phone MRN    1970  [id number] [address] [phone] [id number]    [employer]oodoo Marital Status          Unknown Unknown       Admission Date Admission Type Admitting Provider Attending Provider Department, Room/Bed    6/18/18 Emergency Stacie Fitzgerald MD Allen, Nechol L, MD [address], [facility]/    Discharge Date Discharge Disposition Discharge Destination                       Attending Provider:  Stacie Fitzgerald MD    Allergies:  Penicillins, Sulfa Antibiotics    Isolation:  None   Infection:  None   Code Status:  FULL    Ht:  172.7 cm (68\")   Wt:  87.5 kg (193 lb)    Admission Cmt:  None   Principal Problem:  Perirectal abscess [K61.1] More...                 Active Insurance as of 6/18/2018     Primary Coverage     Payor Plan Insurance Group Employer/Plan Group    [employer] CIGNA MULTIPLAN [id number]     Payor Plan Address Payor Plan Phone Number Effective From Effective To    [address] [phone] 1/1/2018     [facility] [id number]       Subscriber Name Subscriber Birth Date Member ID       PURNIMA KEN 1970 [id number]                 Emergency Contacts      (Rel.) Home Phone Work Phone Mobile Phone    Angy Cross (Sister) [phone] -- --                  ICU Vital Signs     Row Name 06/19/18 1240 06/19/18 0800 06/19/18 0738 06/19/18 0325 06/18/18 2351       Vitals    Temp 97.8 °F (36.6 °C)  -- 97.6 °F (36.4 °C) 98 °F (36.7 °C) 98.1 °F (36.7 °C)    Temp src Oral  -- Oral Oral Oral    Pulse 71  -- 73 55 72    Heart Rate Source Monitor  -- Monitor Monitor Monitor    Resp 18  -- 18 18 18    Resp Rate Source Visual  -- Visual Visual Visual    /87  -- 104/71 115/74 102/61    BP Location Left arm  -- " Right arm Left arm Left arm    BP Method Automatic  -- Automatic Automatic Automatic    Patient Position Sitting  -- Lying Lying Lying       Oxygen Therapy    SpO2 99 %  -- 100 % 96 % 94 %    Pulse Oximetry Type  --  -- Continuous  -- Continuous    Device (Oxygen Therapy) room air room air room air room air nasal cannula    Flow (L/min)  --  --  --  -- 2    Row Name 06/18/18 2045 06/18/18 1955 06/18/18 1900 06/18/18 1843 06/18/18 1815       Vitals    Temp  -- 97.9 °F (36.6 °C)  -- 98.4 °F (36.9 °C) 98 °F (36.7 °C)    Temp src  -- Oral  -- Oral Oral    Pulse 76 74 79 85 87    Heart Rate Source Monitor Monitor Monitor Monitor  --    Resp 18 18 20 18 18    Resp Rate Source Visual Visual Visual  --  --    /82 123/81 113/81 126/78 112/66    BP Location Left arm Left arm Left arm Right arm  --    BP Method Automatic Automatic Automatic Automatic  --    Patient Position Lying Lying Lying Lying  --       Oxygen Therapy    SpO2 97 % 97 % 95 % 96 % 97 %    Pulse Oximetry Type Continuous Continuous Continuous Continuous  --    Device (Oxygen Therapy) nasal cannula nasal cannula nasal cannula nasal cannula  --    Flow (L/min) 2 2 2 2 2    Row Name 06/18/18 1800 06/18/18 1745 06/18/18 1730 06/18/18 1715 06/18/18 1710       Vitals    Pulse 85 84 86 91 102    Resp 18 20 18 18 18    /85 124/78 116/76 123/77 112/81    Noninvasive MAP (mmHg)  --  -- 93 95 88       Oxygen Therapy    SpO2 98 % 97 % 99 % 99 % 98 %    Flow (L/min) 2 2 2 2 4    Row Name 06/18/18 1705 06/18/18 1700 06/18/18 1658 06/18/18 1514 06/18/18 1438       Vitals    Temp  --  -- 99.2 °F (37.3 °C) 98.7 °F (37.1 °C)  --    Temp src  --  -- Oral Oral  --    Pulse 83 80 82 95 84    Heart Rate Source  --  -- Monitor Monitor  --    Resp 18 18 18 20  --    Resp Rate Source  --  -- Visual Visual  --    BP 96/69 104/67 104/67 125/82 117/85    Noninvasive MAP (mmHg) 77 79 79  -- 95    BP Location  --  --  -- Left arm  --    BP Method  --  --  -- Automatic  --     Patient Position  --  --  -- Lying  --       Oxygen Therapy    SpO2 100 % 100 % 99 % 95 %  --    Pulse Oximetry Type  --  -- Continuous Continuous  --    Device (Oxygen Therapy)  --  -- nasal cannula room air  --    Flow (L/min) 4 4 4  --  --    Row Name 06/18/18 1407 06/18/18 1338                Vitals    Pulse 97 86       /69 129/83       Noninvasive MAP (mmHg) 78 109           Lines, Drains & Airways    Active LDAs     Name:   Placement date:   Placement time:   Site:   Days:    Peripheral IV 06/18/18 1527 Left Wrist  06/18/18    1527    Wrist    less than 1    Open Drain Rectal  06/18/18    1640    Rectal Penrose drain  less than 1    Open Drain Rectal  06/18/18    1640    Rectal 18 fr Pesser drain  less than 1         Inactive LDAs     Name:   Placement date:   Placement time:   Removal date:   Removal time:   Site:   Days:    [REMOVED] Peripheral IV 06/18/18 1220 Right Antecubital  06/18/18    1220    06/19/18    1034    Antecubital    less than 1    [REMOVED] Urethral Catheter Temperature probe  06/18/18    1620    06/19/18    0600      less than 1    [REMOVED] ETT   06/18/18    1629 created via procedure documentation  06/18/18    1652      less than 1                Hospital Medications (all)       Dose Frequency Start End    acetaminophen (TYLENOL) tablet 650 mg 650 mg Every 6 Hours 6/18/2018     Sig - Route: Take 2 tablets by mouth Every 6 (Six) Hours. - Oral    cefepime (MAXIPIME) 2 g/100 mL 0.9% NS (mbp) (MAR Hold) ((MAR Hold) since 6/18/2018  3:08 PM) 2 g Once 6/18/2018 6/18/2018    Sig - Route: Infuse 100 mL into a venous catheter 1 (One) Time. - Intravenous    cefepime (MAXIPIME) 2 g/100 mL 0.9% NS (mbp) 2 g Every 8 Hours 6/19/2018 6/23/2018    Sig - Route: Infuse 100 mL into a venous catheter Every 8 (Eight) Hours. - Intravenous    celecoxib (CeleBREX) capsule 200 mg 200 mg Every 12 Hours Scheduled 6/18/2018 6/21/2018    Sig - Route: Take 1 capsule by mouth Every 12 (Twelve) Hours. - Oral     "famotidine (PEPCID) injection 20 mg 20 mg Once 6/18/2018 6/18/2018    Sig - Route: Infuse 2 mL into a venous catheter 1 (One) Time. - Intravenous    HYDROmorphone (DILAUDID) injection 0.5 mg 0.5 mg Every 2 Hours PRN 6/18/2018 6/28/2018    Sig - Route: Infuse 0.5 mL into a venous catheter Every 2 (Two) Hours As Needed for Severe Pain . - Intravenous    Linked Group 1:  \"And\" Linked Group Details        iopamidol (ISOVUE-300) 61 % injection 100 mL 100 mL Once in Imaging 6/18/2018 6/18/2018    Sig - Route: Infuse 100 mL into a venous catheter Once. - Intravenous    lactated ringers infusion 50 mL/hr Continuous 6/18/2018     Sig - Route: Infuse 50 mL/hr into a venous catheter Continuous. - Intravenous    lisinopril (PRINIVIL,ZESTRIL) tablet 10 mg 10 mg Daily 6/19/2018     Sig - Route: Take 0.5 tablets by mouth Daily. - Oral    metroNIDAZOLE (FLAGYL) IVPB 500 mg 500 mg Once 6/18/2018 6/18/2018    Sig - Route: Infuse 100 mL into a venous catheter 1 (One) Time. - Intravenous    metroNIDAZOLE (FLAGYL) IVPB 500 mg 500 mg Every 8 Hours 6/19/2018 6/23/2018    Sig - Route: Infuse 100 mL into a venous catheter Every 8 (Eight) Hours. - Intravenous    montelukast (SINGULAIR) tablet 10 mg 10 mg Every Morning 6/19/2018     Sig - Route: Take 1 tablet by mouth Every Morning. - Oral    naloxone (NARCAN) injection 0.4 mg 0.4 mg Every 5 Minutes PRN 6/18/2018     Sig - Route: Infuse 1 mL into a venous catheter Every 5 (Five) Minutes As Needed for Respiratory Depression. - Intravenous    Linked Group 1:  \"And\" Linked Group Details        nitroglycerin (NITROSTAT) SL tablet 0.4 mg 0.4 mg Every 5 Minutes PRN 6/18/2018     Sig - Route: Place 1 tablet under the tongue Every 5 (Five) Minutes As Needed for Chest Pain (if systolic BP greater than 100 mm/Hg.). - Sublingual    ondansetron (ZOFRAN) injection 4 mg 4 mg Every 6 Hours PRN 6/18/2018     Sig - Route: Infuse 2 mL into a venous catheter Every 6 (Six) Hours As Needed for Nausea or Vomiting. " "- Intravenous    oxyCODONE (ROXICODONE) immediate release tablet 10 mg 10 mg Every 4 Hours PRN 6/18/2018 6/28/2018    Sig - Route: Take 2 tablets by mouth Every 4 (Four) Hours As Needed for Moderate Pain . - Oral    Linked Group 2:  \"Or\" Linked Group Details        oxyCODONE (ROXICODONE) immediate release tablet 5 mg 5 mg Every 4 Hours PRN 6/18/2018 6/28/2018    Sig - Route: Take 1 tablet by mouth Every 4 (Four) Hours As Needed for Moderate Pain . - Oral    Linked Group 2:  \"Or\" Linked Group Details        PARoxetine (PAXIL) tablet 20 mg 20 mg Every Morning 6/19/2018     Sig - Route: Take 1 tablet by mouth Every Morning. - Oral    polyethylene glycol 3350 powder (packet) 17 g Daily 6/19/2018     Sig - Route: Take 17 g by mouth Daily. - Oral    Cosign for Ordering: Required by Stacie Fitzgerald MD    vancomycin 1750 mg/500 mL 0.9% NS IVPB ([facility]) 20 mg/kg × 87.5 kg Once 6/18/2018 6/18/2018    Sig - Route: Infuse 500 mL into a venous catheter 1 (One) Time. - Intravenous    bupivacaine PF 30 mL, lidocaine-EPINEPHrine 30 mL mixture (Discontinued)  As Needed 6/18/2018 6/18/2018    Sig: As Needed.    Reason for Discontinue: Patient Discharge    diphenhydrAMINE (BENADRYL) injection 12.5 mg (Discontinued) 12.5 mg Every 15 Minutes PRN 6/18/2018 6/18/2018    Sig - Route: Infuse 0.25 mL into a venous catheter Every 15 (Fifteen) Minutes As Needed for Itching (May repeat x 1). - Intravenous    ePHEDrine injection 5 mg (Discontinued) 5 mg Once As Needed 6/18/2018 6/18/2018    Sig - Route: Infuse 0.1 mL into a venous catheter 1 (One) Time As Needed (symptomatic hypotension - Notify attending anesthesiologist if this needs to be given). - Intravenous    fentaNYL citrate (PF) (SUBLIMAZE) injection 50 mcg (Discontinued) 50 mcg Every 10 Minutes PRN 6/18/2018 6/18/2018    Sig - Route: Infuse 1 mL into a venous catheter Every 10 (Ten) Minutes As Needed for Severe Pain . - Intravenous    Reason for Discontinue: Patient Transfer    fentaNYL " citrate (PF) (SUBLIMAZE) injection 50 mcg (Discontinued) 50 mcg Every 5 Minutes PRN 6/18/2018 6/18/2018    Sig - Route: Infuse 1 mL into a venous catheter Every 5 (Five) Minutes As Needed for Moderate Pain  or Severe Pain . - Intravenous    flumazenil (ROMAZICON) injection 0.2 mg (Discontinued) 0.2 mg As Needed 6/18/2018 6/18/2018    Sig - Route: Infuse 2 mL into a venous catheter As Needed (for benzodiazepine induced unresponsiveness or sedation). - Intravenous    HYDROcodone-acetaminophen (NORCO) 7.5-325 MG per tablet 1 tablet (Discontinued) 1 tablet Once As Needed 6/18/2018 6/18/2018    Sig - Route: Take 1 tablet by mouth 1 (One) Time As Needed for Mild Pain . - Oral    HYDROmorphone (DILAUDID) injection 0.5 mg (Discontinued) 0.5 mg Every 5 Minutes PRN 6/18/2018 6/18/2018    Sig - Route: Infuse 0.5 mL into a venous catheter Every 5 (Five) Minutes As Needed for Moderate Pain  or Severe Pain . - Intravenous    labetalol (NORMODYNE,TRANDATE) injection 5 mg (Discontinued) 5 mg Every 5 Minutes PRN 6/18/2018 6/18/2018    Sig - Route: Infuse 1 mL into a venous catheter Every 5 (Five) Minutes As Needed for High Blood Pressure (for systolic blood pressure greater than 180 mmHg or diastolic blood pressure greater than 105 mmHg). - Intravenous    lactated ringers infusion (Discontinued) 9 mL/hr Continuous 6/18/2018 6/18/2018    Sig - Route: Infuse 9 mL/hr into a venous catheter Continuous. - Intravenous    lidocaine PF 1% (XYLOCAINE) injection 0.5 mL (Discontinued) 0.5 mL Once As Needed 6/18/2018 6/18/2018    Sig - Route: Inject 0.5 mL as directed 1 (One) Time As Needed (IV Start). - Injection    Reason for Discontinue: Patient Transfer    metroNIDAZOLE (FLAGYL) IVPB 500 mg (Discontinued) 500 mg Once 6/18/2018 6/18/2018    Sig - Route: Infuse 100 mL into a venous catheter 1 (One) Time. - Intravenous    Reason for Discontinue: Duplicate order    midazolam (VERSED) injection 1 mg (Discontinued) 1 mg Every 5 Minutes PRN  "6/18/2018 6/18/2018    Sig - Route: Infuse 1 mL into a venous catheter Every 5 (Five) Minutes As Needed for Anxiety (Max 4mg midazolam TOTAL). - Intravenous    Reason for Discontinue: Patient Transfer    Linked Group 3:  \"Or\" Linked Group Details        midazolam (VERSED) injection 2 mg (Discontinued) 2 mg Every 5 Minutes PRN 6/18/2018 6/18/2018    Sig - Route: Infuse 2 mL into a venous catheter Every 5 (Five) Minutes As Needed for Anxiety (Max 4mg midazolam TOTAL). - Intravenous    Reason for Discontinue: Patient Transfer    Linked Group 3:  \"Or\" Linked Group Details        naloxone (NARCAN) injection 0.2 mg (Discontinued) 0.2 mg As Needed 6/18/2018 6/18/2018    Sig - Route: Infuse 0.5 mL into a venous catheter As Needed for Opioid Reversal or Respiratory Depression (unresponsiveness, decrease oxygen saturation). - Intravenous    ondansetron (ZOFRAN) injection 4 mg (Discontinued) 4 mg Once As Needed 6/18/2018 6/18/2018    Sig - Route: Infuse 2 mL into a venous catheter 1 (One) Time As Needed for Nausea or Vomiting. - Intravenous    oxyCODONE-acetaminophen (PERCOCET) 7.5-325 MG per tablet 1 tablet (Discontinued) 1 tablet Once As Needed 6/18/2018 6/18/2018    Sig - Route: Take 1 tablet by mouth 1 (One) Time As Needed for Moderate Pain . - Oral    povidone-iodine (BETADINE) external solution (Discontinued)  As Needed 6/18/2018 6/18/2018    Sig: As Needed.    Reason for Discontinue: Patient Discharge    promethazine (PHENERGAN) injection 12.5 mg (Discontinued) 12.5 mg Once As Needed 6/18/2018 6/18/2018    Sig - Route: Infuse 0.5 mL into a venous catheter 1 (One) Time As Needed for Nausea or Vomiting. - Intravenous    Linked Group 4:  \"Or\" Linked Group Details        promethazine (PHENERGAN) injection 12.5 mg (Discontinued) 12.5 mg Once As Needed 6/18/2018 6/18/2018    Sig - Route: Inject 0.5 mL into the shoulder, thigh, or buttocks 1 (One) Time As Needed for Nausea or Vomiting. - Intramuscular    Linked Group 4:  \"Or\" " "Linked Group Details        promethazine (PHENERGAN) suppository 25 mg (Discontinued) 25 mg Once As Needed 6/18/2018 6/18/2018    Sig - Route: Insert 1 suppository into the rectum 1 (One) Time As Needed for Nausea or Vomiting. - Rectal    Linked Group 4:  \"Or\" Linked Group Details        promethazine (PHENERGAN) tablet 12.5 mg (Discontinued) 12.5 mg Once As Needed 6/18/2018 6/18/2018    Sig - Route: Take 0.5 tablets by mouth 1 (One) Time As Needed for Nausea or Vomiting. - Oral    promethazine (PHENERGAN) tablet 25 mg (Discontinued) 25 mg Once As Needed 6/18/2018 6/18/2018    Sig - Route: Take 1 tablet by mouth 1 (One) Time As Needed for Nausea or Vomiting. - Oral    Linked Group 4:  \"Or\" Linked Group Details        sodium chloride (NS) irrigation solution (Discontinued)  As Needed 6/18/2018 6/18/2018    Sig: As Needed.    Reason for Discontinue: Patient Discharge    sodium chloride 0.9 % bolus 1,000 mL (Discontinued) 1,000 mL Once 6/18/2018 6/18/2018    Sig - Route: Infuse 1,000 mL into a venous catheter 1 (One) Time. - Intravenous    sodium chloride 0.9 % flush 1-10 mL (Discontinued) 1-10 mL As Needed 6/18/2018 6/18/2018    Sig - Route: Infuse 1-10 mL into a venous catheter As Needed for Line Care. - Intravenous    Reason for Discontinue: Patient Transfer    sodium chloride 0.9 % flush 1-10 mL (Discontinued) 1-10 mL As Needed 6/18/2018 6/18/2018    Sig - Route: Infuse 1-10 mL into a venous catheter As Needed for Line Care. - Intravenous    Reason for Discontinue: Patient Transfer    sodium chloride 0.9 % flush 10 mL (Discontinued) 10 mL As Needed 6/18/2018 6/18/2018    Sig - Route: Infuse 10 mL into a venous catheter As Needed for Line Care. - Intravenous    Linked Group 5:  \"And\" Linked Group Details              Lab Results (last 24 hours)     Procedure Component Value Units Date/Time    Wound Culture - Wound, Per Rectum [[id number]]  (Normal) Collected:  06/18/18 1629    Specimen:  Wound from Per Rectum Updated: "  06/19/18 1136     Wound Culture Growth present, too young to evaluate     Gram Stain Result Many (4+) Red blood cells      Moderate (3+) WBCs seen      Occasional Gram positive cocci    Basic Metabolic Panel [[id number]]  (Abnormal) Collected:  06/19/18 0320    Specimen:  Blood Updated:  06/19/18 0434     Glucose 146 (H) mg/dL      BUN 10 mg/dL      Creatinine 0.66 mg/dL      Sodium 138 mmol/L      Potassium 4.4 mmol/L      Chloride 102 mmol/L      CO2 25.1 mmol/L      Calcium 9.2 mg/dL      eGFR Non African Amer 96 mL/min/1.73      BUN/Creatinine Ratio 15.2     Anion Gap 10.9 mmol/L     Narrative:       GFR Normal >60  Chronic Kidney Disease <60  Kidney Failure <15    Magnesium [[id number]]  (Normal) Collected:  06/19/18 0320    Specimen:  Blood Updated:  06/19/18 0434     Magnesium 2.5 mg/dL     Protime-INR [[id number]]  (Abnormal) Collected:  06/19/18 0320    Specimen:  Blood Updated:  06/19/18 0414     Protime 14.6 (H) Seconds      INR 1.16 (H)    aPTT [[id number]]  (Normal) Collected:  06/19/18 0320    Specimen:  Blood Updated:  06/19/18 0414     PTT 32.1 seconds     CBC & Differential [[id number]] Collected:  06/19/18 0320    Specimen:  Blood Updated:  06/19/18 0408    Narrative:       The following orders were created for panel order CBC & Differential.  Procedure                               Abnormality         Status                     ---------                               -----------         ------                     CBC Auto Differential[[id number]]        Abnormal            Final result                 Please view results for these tests on the individual orders.    CBC Auto Differential [[id number]]  (Abnormal) Collected:  06/19/18 0320    Specimen:  Blood Updated:  06/19/18 0408     WBC 14.61 (H) 10*3/mm3      RBC 4.25 10*6/mm3      Hemoglobin 13.0 g/dL      Hematocrit 40.8 %      MCV 96.0 fL      MCH 30.6 pg      MCHC 31.9 (L) g/dL      RDW 12.4 %      RDW-SD 42.8 fl      MPV 9.9 fL      Platelets 328  10*3/mm3      Neutrophil % 92.4 (H) %      Lymphocyte % 5.0 (L) %      Monocyte % 2.3 (L) %      Eosinophil % 0.0 (L) %      Basophil % 0.1 %      Immature Grans % 0.2 %      Neutrophils, Absolute 13.50 (H) 10*3/mm3      Lymphocytes, Absolute 0.73 (L) 10*3/mm3      Monocytes, Absolute 0.34 10*3/mm3      Eosinophils, Absolute 0.00 10*3/mm3      Basophils, Absolute 0.01 10*3/mm3      Immature Grans, Absolute 0.03 10*3/mm3     Anaerobic Culture - Wound, Per Rectum [[id number]] Collected:  06/18/18 1629    Specimen:  Wound from Per Rectum Updated:  06/18/18 1636        Imaging Results (last 24 hours)     Procedure Component Value Units Date/Time    CT Abdomen Pelvis With Contrast [[id number]] Collected:  06/18/18 1438     Updated:  06/18/18 1441    Narrative:       CT OF THE ABDOMEN AND PELVIS WITH CONTRAST 06/18/2018      HISTORY: Rectal pain. Spiral images were obtained from the lung bases to  the symphysis pubis after intravenous contrast.     FINDINGS:  The liver, gallbladder, spleen, pancreas and adrenals appear  unremarkable. Small left renal cyst is seen.. No bowel wall thickening  or bowel dilatation is seen.     A 3.7 cm right adnexal cyst is seen. IUD is seen in the uterus. Left  ovary is unremarkable.     In the perineum, there is an ill-defined collection measuring up to 6.0  cm in size. This demonstrates some increased soft tissue as well as  small air collections. This is consistent with a rectal abscess. This is  incompletely included in the field-of-view on this CT of the abdomen and  pelvis.     No intrapelvic fluid collections or abscess is seen.       Impression:       1. Rectal abscess measuring up to 6.0 cm in size.  2. A 3.7 cm right adnexal cyst.     Radiation dose reduction techniques were utilized, including automated  exposure control and exposure modulation based on body size.     This report was finalized on 6/18/2018 2:38 PM by Dr. Larry Pino M.D.                Operative/Procedure  Notes (last 24 hours) (Notes from 6/18/2018  1:32 PM through 6/19/2018  1:32 PM)      Stacie Urrutia MD at 6/18/2018  4:27 PM          06/18/18    Surgeon: Stacie Urrutia MD    Surgical  Assistant: Gina Spaulding PA-C     Preoperative diagnosis: Perirectal abscess [K61.1]    Post-Op Diagnosis Codes:     * Perirectal abscess [K61.1]    Procedure: Incision and Drainage of Perirectal Abscess with drain placement, * Panel 2 does not exist *    Estimated Blood Loss: minimal    Specimens:   ID Type Source Tests Collected by Time   1 : Culture swab from perirectal abscess Wound Per Rectum ANAEROBIC CULTURE, WOUND CULTURE Stacie Urrutia MD 6/18/2018 0311       Indication:  Chante Harrell is a 48 y.o. female who comes in with Perirectal abscess  Patient understands risks, benefits,and alternatives wishes to proceed.      Procedure Details:   Dictation on: 06/18/2018  5:14 PM by: STACIE URRUTIA [[id number]]         Electronically signed by Stacie Urrutia MD at 6/18/2018  5:14 PM          Physician Progress Notes (last 24 hours) (Notes from 6/18/2018  1:32 PM through 6/19/2018  1:32 PM)      Gina Spaulding PA-C at 6/19/2018  6:50 AM          Progress Note    POD1 s/p incision and drainage perirectal abscess with multiple drain placement    S: negative flatus,  negative BM. positive ambulating. Pain controlled on scheduled po tylenol and celebrex, and prn oxycodone    Tolerating regular diet    O:  Temp:  [97.9 °F (36.6 °C)-99.2 °F (37.3 °C)] 98 °F (36.7 °C)  Heart Rate:  [] 55  Resp:  [18-20] 18  BP: ()/(61-89) 115/74      Intake/Output Summary (Last 24 hours) at 06/19/18 0650  Last data filed at 06/19/18 0600   Gross per 24 hour   Intake              900 ml   Output             2450 ml   Net            -1550 ml       Abd: soft, non-distended  Perianal: drains in place with small amount sanguinous drainage      Results from last 7 days  Lab Units 06/19/18  0320 06/18/18  1221   WBC 10*3/mm3 14.61* 17.79*    HEMOGLOBIN g/dL 13.0 13.6   HEMATOCRIT % 40.8 40.5   PLATELETS 10*3/mm3 328 352       Results from last 7 days  Lab Units 06/19/18  0320 06/18/18  1221   SODIUM mmol/L 138 138   POTASSIUM mmol/L 4.4 3.7   CHLORIDE mmol/L 102 98   CO2 mmol/L 25.1 26.3   BUN mg/dL 10 16   CREATININE mg/dL 0.66 0.82   GLUCOSE mg/dL 146* 119*   CALCIUM mg/dL 9.2 9.4         Results from last 7 days  Lab Units 06/19/18  0320   MAGNESIUM mg/dL 2.5     Wound cx pending    A/P: 48 y.o. female POD1 s/p incision and drainage perirectal abscess with multiple drain placement    -afebrile, VSS  -white count down from yesterday  -continue flagyl; wound cx pending  -miralax qd  -regular diet as tolerated  -ambulate  -she can shower      Scribed for Stacie Fitzgerald MD by Gina Spaulding PA-C. 6/19/2018  6:50 AM    Electronically signed by Gina Spaulding PA-C at 6/19/2018  8:13 AM          Consult Notes (last 24 hours) (Notes from 6/18/2018  1:32 PM through 6/19/2018  1:32 PM)      Stacie Fitzgerald MD at 6/18/2018  2:53 PM      Consult Orders:    1. Surgery (on-call MD unless specified) [[id number]] ordered by Calvin Butler MD at 06/18/18 1420                Chante Harrell is a 48 y.o. female who is seen at the request of Dr. Butler for Rectal Pain (pain and swelling) and Abscess (perirectal).      HPI:pt c/o anal pain that kept her awake last night.  She states that the pain started 3 wks ago.  She thought it was hemorrhoids and tried otc suppository. She denies fever, nausea, or vomitting.  She states that 3 wks ago was constipated and strained a little and that is why thought hemorrhoids. Denies drainage pr      Past Medical History:   Diagnosis Date   • Hypertension    oa  depression    Psh:  t and a      Social History:   reports that she has never smoked. She has never used smokeless tobacco. She reports that she drinks alcohol. She reports that she does not use drugs.      Marriage status: divorce      Family history :  Colon ca-  no  Positive for :Dm, Cad, cva ,Blood clots    Current Facility-Administered Medications:   •  cefepime (MAXIPIME) 2 g/100 mL 0.9% NS (mbp), 2 g, Intravenous, Once, Calvin Butler MD  •  metroNIDAZOLE (FLAGYL) IVPB 500 mg, 500 mg, Intravenous, Once, Calvin Butler MD  •  sodium chloride 0.9 % bolus 1,000 mL, 1,000 mL, Intravenous, Once, Calvin Butler MD  •  Insert peripheral IV, , , Once **AND** sodium chloride 0.9 % flush 10 mL, 10 mL, Intravenous, PRN, Calvin Butler MD  •  vancomycin 1750 mg/500 mL 0.9% NS IVPB ([facility]), 20 mg/kg, Intravenous, Once, Calvin Butler MD  No current outpatient prescriptions on file.    Allergy  Penicillins and Sulfa antibiotics    Review of Systems   Constitution: Negative for chills, fever and weakness.   HENT: Negative for congestion, hearing loss and hoarse voice.    Eyes: Negative for blurred vision, discharge and visual disturbance.   Cardiovascular: Negative for chest pain, cyanosis and leg swelling.   Respiratory: Negative for cough, shortness of breath, sleep disturbances due to breathing and snoring.    Endocrine: Negative for cold intolerance and heat intolerance.   Hematologic/Lymphatic: Does not bruise/bleed easily.   Skin: Negative for itching, poor wound healing and skin cancer.   Musculoskeletal: Negative for arthritis, back pain, joint pain and joint swelling.   Gastrointestinal: Negative for abdominal pain, change in bowel habit and bowel incontinence.   Genitourinary: Negative for bladder incontinence, dysuria and hematuria.   Neurological: Negative for brief paralysis, excessive daytime sleepiness, dizziness, focal weakness, headaches and light-headedness.   Psychiatric/Behavioral: Negative for altered mental status and hallucinations. The patient does not have insomnia.    Allergic/Immunologic: Negative for HIV exposure and persistent infections.       Vitals:    06/18/18 1233   BP: 124/84   Pulse:    Resp:    Temp:    SpO2:      Body mass index  is 29.35 kg/m².    Physical Exam   Constitutional: She is oriented to person, place, and time. She appears well-developed and well-nourished. No distress.   HENT:   Head: Normocephalic and atraumatic.   Nose: Nose normal.   Mouth/Throat: Oropharynx is clear and moist.   Eyes: Conjunctivae and EOM are normal. Pupils are equal, round, and reactive to light.   Neck: Normal range of motion. No tracheal deviation present.   Pulmonary/Chest: Effort normal and breath sounds normal. No respiratory distress.   Abdominal: Soft. Bowel sounds are normal. She exhibits no distension.   Musculoskeletal: Normal range of motion. She exhibits no edema or deformity.   Neurological: She is alert and oriented to person, place, and time. No cranial nerve deficit. Coordination and gait normal.   Skin: Skin is warm and dry.   Psychiatric: She has a normal mood and affect. Her behavior is normal. Judgment normal.       Review of Medical Record:  I reviewed ct scan - air and large perirectal abscess    Assessment:  1. Perirectal abscess        Plan:  Concerned for du gangrene with the the incredible amount of air.    To the or for drainage and possible diverting colostomy        Electronically signed by Stacie Fitzgerald MD at 6/18/2018  3:44 PM

## 2018-06-19 NOTE — PROGRESS NOTES
Progress Note    POD1 s/p incision and drainage perirectal abscess with multiple drain placement    S: negative flatus,  negative BM. positive ambulating. Pain controlled on scheduled po tylenol and celebrex, and prn oxycodone    Tolerating regular diet    O:  Temp:  [97.9 °F (36.6 °C)-99.2 °F (37.3 °C)] 98 °F (36.7 °C)  Heart Rate:  [] 55  Resp:  [18-20] 18  BP: ()/(61-89) 115/74      Intake/Output Summary (Last 24 hours) at 06/19/18 0650  Last data filed at 06/19/18 0600   Gross per 24 hour   Intake              900 ml   Output             2450 ml   Net            -1550 ml       Abd: soft, non-distended  Perianal: drains in place with small amount sanguinous drainage      Results from last 7 days  Lab Units 06/19/18  0320 06/18/18  1221   WBC 10*3/mm3 14.61* 17.79*   HEMOGLOBIN g/dL 13.0 13.6   HEMATOCRIT % 40.8 40.5   PLATELETS 10*3/mm3 328 352       Results from last 7 days  Lab Units 06/19/18  0320 06/18/18  1221   SODIUM mmol/L 138 138   POTASSIUM mmol/L 4.4 3.7   CHLORIDE mmol/L 102 98   CO2 mmol/L 25.1 26.3   BUN mg/dL 10 16   CREATININE mg/dL 0.66 0.82   GLUCOSE mg/dL 146* 119*   CALCIUM mg/dL 9.2 9.4         Results from last 7 days  Lab Units 06/19/18  0320   MAGNESIUM mg/dL 2.5     Wound cx pending    A/P: 48 y.o. female POD1 s/p incision and drainage perirectal abscess with multiple drain placement    -afebrile, VSS  -white count down from yesterday  -continue flagyl; wound cx pending  -miralax qd  -regular diet as tolerated  -ambulate  -she can shower      Scribed for Stacie Fitzgerald MD by Gina Spaulding PA-C. 6/19/2018    This patient was evaluated by me, recommendations made, documentation reviewed, edited, and revised by me, Stacie Fitzgerald MD

## 2018-06-20 VITALS
RESPIRATION RATE: 20 BRPM | HEART RATE: 73 BPM | SYSTOLIC BLOOD PRESSURE: 121 MMHG | TEMPERATURE: 97.6 F | DIASTOLIC BLOOD PRESSURE: 71 MMHG | BODY MASS INDEX: 29.25 KG/M2 | OXYGEN SATURATION: 97 % | HEIGHT: 68 IN | WEIGHT: 193 LBS

## 2018-06-20 LAB
BASOPHILS # BLD AUTO: 0.03 10*3/MM3 (ref 0–0.2)
BASOPHILS NFR BLD AUTO: 0.3 % (ref 0–1.5)
DEPRECATED RDW RBC AUTO: 43.5 FL (ref 37–54)
EOSINOPHIL # BLD AUTO: 0.09 10*3/MM3 (ref 0–0.7)
EOSINOPHIL NFR BLD AUTO: 0.9 % (ref 0.3–6.2)
ERYTHROCYTE [DISTWIDTH] IN BLOOD BY AUTOMATED COUNT: 12.6 % (ref 11.7–13)
HCT VFR BLD AUTO: 35.1 % (ref 35.6–45.5)
HGB BLD-MCNC: 11.3 G/DL (ref 11.9–15.5)
IMM GRANULOCYTES # BLD: 0.02 10*3/MM3 (ref 0–0.03)
IMM GRANULOCYTES NFR BLD: 0.2 % (ref 0–0.5)
LYMPHOCYTES # BLD AUTO: 2.87 10*3/MM3 (ref 0.9–4.8)
LYMPHOCYTES NFR BLD AUTO: 29.5 % (ref 19.6–45.3)
MCH RBC QN AUTO: 30.7 PG (ref 26.9–32)
MCHC RBC AUTO-ENTMCNC: 32.2 G/DL (ref 32.4–36.3)
MCV RBC AUTO: 95.4 FL (ref 80.5–98.2)
MONOCYTES # BLD AUTO: 0.76 10*3/MM3 (ref 0.2–1.2)
MONOCYTES NFR BLD AUTO: 7.8 % (ref 5–12)
NEUTROPHILS # BLD AUTO: 5.97 10*3/MM3 (ref 1.9–8.1)
NEUTROPHILS NFR BLD AUTO: 61.3 % (ref 42.7–76)
PLATELET # BLD AUTO: 315 10*3/MM3 (ref 140–500)
PMV BLD AUTO: 9.9 FL (ref 6–12)
RBC # BLD AUTO: 3.68 10*6/MM3 (ref 3.9–5.2)
WBC NRBC COR # BLD: 9.74 10*3/MM3 (ref 4.5–10.7)

## 2018-06-20 PROCEDURE — 25010000002 CEFEPIME PER 500 MG: Performed by: COLON & RECTAL SURGERY

## 2018-06-20 PROCEDURE — 85025 COMPLETE CBC W/AUTO DIFF WBC: CPT | Performed by: PHYSICIAN ASSISTANT

## 2018-06-20 PROCEDURE — 94799 UNLISTED PULMONARY SVC/PX: CPT

## 2018-06-20 RX ORDER — SULFAMETHOXAZOLE AND TRIMETHOPRIM 800; 160 MG/1; MG/1
1 TABLET ORAL 2 TIMES DAILY
Qty: 14 TABLET | Refills: 0 | Status: SHIPPED | OUTPATIENT
Start: 2018-06-20 | End: 2018-06-26

## 2018-06-20 RX ORDER — OXYCODONE HYDROCHLORIDE AND ACETAMINOPHEN 5; 325 MG/1; MG/1
TABLET ORAL
Qty: 36 TABLET | Refills: 0 | Status: SHIPPED | OUTPATIENT
Start: 2018-06-20 | End: 2018-07-25

## 2018-06-20 RX ADMIN — MONTELUKAST 10 MG: 10 TABLET, FILM COATED ORAL at 06:02

## 2018-06-20 RX ADMIN — PAROXETINE HYDROCHLORIDE 20 MG: 20 TABLET, FILM COATED ORAL at 06:02

## 2018-06-20 RX ADMIN — CEFEPIME HYDROCHLORIDE 2 G: 2 INJECTION, POWDER, FOR SOLUTION INTRAVENOUS at 09:53

## 2018-06-20 RX ADMIN — LISINOPRIL 10 MG: 20 TABLET ORAL at 09:53

## 2018-06-20 RX ADMIN — POLYETHYLENE GLYCOL 3350 17 G: 17 POWDER, FOR SOLUTION ORAL at 09:53

## 2018-06-20 RX ADMIN — METRONIDAZOLE 500 MG: 500 INJECTION, SOLUTION INTRAVENOUS at 09:53

## 2018-06-20 RX ADMIN — CELECOXIB 200 MG: 200 CAPSULE ORAL at 09:53

## 2018-06-20 NOTE — PLAN OF CARE
Problem: Patient Care Overview  Goal: Plan of Care Review  Outcome: Ongoing (interventions implemented as appropriate)   06/19/18 2356 06/20/18 0133   Coping/Psychosocial   Plan of Care Reviewed With patient --    Plan of Care Review   Progress --  no change   OTHER   Outcome Summary --  Pt up ad anju. VSS. Pain controlled. Pt voiced anxiety about having a BM with drains in. Safety maintained. Continue to monitor.      Goal: Individualization and Mutuality  Outcome: Ongoing (interventions implemented as appropriate)    Goal: Discharge Needs Assessment  Outcome: Ongoing (interventions implemented as appropriate)    Goal: Interprofessional Rounds/Family Conf  Outcome: Ongoing (interventions implemented as appropriate)      Problem: Pain, Acute (Adult)  Goal: Acceptable Pain Control/Comfort Level  Outcome: Ongoing (interventions implemented as appropriate)      Problem: Infection, Risk/Actual (Adult)  Goal: Infection Prevention/Resolution  Outcome: Ongoing (interventions implemented as appropriate)

## 2018-06-20 NOTE — PROGRESS NOTES
Progress Note    POD2 s/p incision and drainage perirectal abscess with multiple drain placement    S: positive flatus,  negative BM. positive ambulating. Pain controlled on po celebrex & oxycodone    O:  Temp:  [97.6 °F (36.4 °C)-97.8 °F (36.6 °C)] 97.6 °F (36.4 °C)  Heart Rate:  [62-83] 73  Resp:  [16-20] 20  BP: (106-121)/(71-87) 121/71      Intake/Output Summary (Last 24 hours) at 06/20/18 1233  Last data filed at 06/20/18 0500   Gross per 24 hour   Intake              360 ml   Output             3900 ml   Net            -3540 ml       Abd: soft, non-distended  Perianal: 2 drains in place with small amount serosang drainage.  Induration improved      Results from last 7 days  Lab Units 06/20/18  0332 06/19/18  0320 06/18/18  1221   WBC 10*3/mm3 9.74 14.61* 17.79*   HEMOGLOBIN g/dL 11.3* 13.0 13.6   HEMATOCRIT % 35.1* 40.8 40.5   PLATELETS 10*3/mm3 315 328 352         Results from last 7 days  Lab Units 06/19/18  0320   SODIUM mmol/L 138   POTASSIUM mmol/L 4.4   CHLORIDE mmol/L 102   CO2 mmol/L 25.1   BUN mg/dL 10   CREATININE mg/dL 0.66   GLUCOSE mg/dL 146*   CALCIUM mg/dL 9.2       Results from last 7 days  Lab Units 06/19/18  0320   MAGNESIUM mg/dL 2.5     Wound cx: gram+ cocci  Anaerobic cx pending    A/P: 48 y.o. female POD2 s/p incision and drainage perirectal abscess with multiple drain placement    As she is afebrile, VSS, leukocytosis resolved, tolerating regular diet, she is deemed stable for d/c to home on Bactrim.  F/u in office on Friday.    Scribed for Stacie Fitzgerald MD by Gina Spaulding PA-C. 6/20/2018  12:34 PM   This patient was evaluated by me, recommendations made, documentation reviewed, edited, and revised by me, Stacie Fitzgerald MD

## 2018-06-20 NOTE — PROGRESS NOTES
Progress Note    POD2 s/p incision and drainage perirectal abscess with multiple drain placement    S: positive flatus,  negative BM. positive ambulating. Pain controlled on celebrex & oxycodone    Tolerating regular diet with no n/v    O:  Temp:  [97.6 °F (36.4 °C)-97.8 °F (36.6 °C)] 97.8 °F (36.6 °C)  Heart Rate:  [68-83] 68  Resp:  [16-18] 16  BP: (104-107)/(71-87) 107/71      Intake/Output Summary (Last 24 hours) at 06/20/18 0645  Last data filed at 06/19/18 2133   Gross per 24 hour   Intake              570 ml   Output             2900 ml   Net            -2330 ml   UOP: 2900cc    Abd: soft, non-distended  Perianal: 2 drains in place; small amount serosang drainage.  Unchanged erythema and induration      Results from last 7 days  Lab Units 06/20/18  0332 06/19/18  0320 06/18/18  1221   WBC 10*3/mm3 9.74 14.61* 17.79*   HEMOGLOBIN g/dL 11.3* 13.0 13.6   HEMATOCRIT % 35.1* 40.8 40.5   PLATELETS 10*3/mm3 315 328 352       Culture results from last 30 days  Lab 06/18/18  1629   WOUNDCX Scant growth (1+) Gram Positive Cocci*     Anaerobic cx pending    A/P: 48 y.o. female POD2 s/p incision and drainage perirectal abscess with multiple drain placement    -afebrile, VSS, leukocytosis resolved  -gram+ cocci.  Anaerobic cx still pending  -continue ambulation  -discussed with patient no special hygiene regimen needed to clean after BM    Gina Spaulding PA-C. 6/20/2018  6:45 AM

## 2018-06-20 NOTE — PROGRESS NOTES
Discharge Planning Assessment  [facility]     Patient Name: Chante Harrell  MRN: [id number]  Today's Date: 6/20/2018    Admit Date: 6/18/2018          Discharge Needs Assessment     Row Name 06/20/18 1325       Living Environment    Lives With child(dejuan), dependent    Current Living Arrangements home/apartment/condo    Primary Care Provided by self    Provides Primary Care For child(dejuan)    Caregiving Concerns Has two teen age sons    Family Caregiver if Needed none;sibling(s)    Quality of Family Relationships unable to assess    Able to Return to Prior Arrangements yes       Resource/Environmental Concerns    Resource/Environmental Concerns none    Transportation Concerns car, none       Transition Planning    Patient/Family Anticipates Transition to home with family    Patient/Family Anticipated Services at Transition none    Transportation Anticipated car, drives self       Discharge Needs Assessment    Concerns to be Addressed no discharge needs identified    Equipment Currently Used at Home none    Equipment Needed After Discharge none            Discharge Plan     Row Name 06/20/18 1321       Plan    Plan Home no needs    Plan Comments CCP spoke with patient at bedside. CCP role explained and discharge planning discussed.  Face sheet verified IMM noted.  Pt's emergency contact is her sister Angy, [phone].  Her PCP is Dr Nicolas Cool.  Pt lives in a house with two teenaged sons that live part time with their dad.    She is independent with ADL's.   Pt uses no DME.   Patient has no Home health history.   Patient has not been to rehab.  Patient uses [facility] pharmacy on [address] for her medications   Plan is to go home with no needs.  CCP will follow         Destination     No service coordination in this encounter.      Durable Medical Equipment     No service coordination in this encounter.      Dialysis/Infusion     No service coordination in this encounter.      Home Medical Care     No service  coordination in this encounter.      Social Care     No service coordination in this encounter.        Expected Discharge Date and Time     Expected Discharge Date Expected Discharge Time    Jun 20, 2018               Demographic Summary    No documentation.           Functional Status     Row Name 06/20/18 1324       Functional Status    Usual Activity Tolerance good;excellent    Current Activity Tolerance excellent       Functional Status, IADL    Medications independent    Meal Preparation independent    Housekeeping independent    Laundry independent    Shopping independent       Mental Status    General Appearance WDL WDL       Mental Status Summary    Recent Changes in Mental Status/Cognitive Functioning no changes       Employment/    Employment Status employed full time            Psychosocial    No documentation.           Abuse/Neglect    No documentation.           Legal    No documentation.           Substance Abuse    No documentation.           Patient Forms    No documentation.         Lorrie Yadav RN

## 2018-06-20 NOTE — DISCHARGE SUMMARY
Date of Admission: 6/18/2018  Date of Discharge: 6/20/2018     Presenting Problem/History of Present Illness  Perirectal abscess [K61.1]  Perirectal abscess [K61.1]     Discharge Diagnosis:   Past Medical History:   Diagnosis Date   • Hypertension        Procedures Performed  Procedure(s):  Incision and Drainage of Perirectal Abscess with drain placement      Hospital Course  Patient is a 48 y.o. female presented to ED with large perirectal abscess.  Emergent incision and drainage of perirectal abscess with multiple drain placement performed 6/18/2018 without incident; patient tolerated well.  She was also started on IV vanc & cefipime.    Patient's postoperative course unremarkable.  She was afebrile with VSS throughout admission.  White count normalized.  She had improved perianal erythema and induration, and drainage per drains.  Wound culture showed gram-positive cocci; anaerobic culture still pending as of discharge.  Patient discharged to home POD2 on po Bactrim.  Rx provided for pain.      Consults:   Consults     Date and Time Order Name Status Description    6/18/2018 1420 Surgery (on-call MD unless specified) Completed             Discharge Disposition  Home or Self Care    Discharge Medications     Discharge Medications      New Medications      Instructions Start Date   oxyCODONE-acetaminophen 5-325 MG per tablet  Commonly known as:  ROXICET   1-2 tabs po Q 6 hours prn pain       sulfamethoxazole-trimethoprim 800-160 MG per tablet  Commonly known as:  BACTRIM DS   1 tablet, Oral, 2 Times Daily         Continue These Medications      Instructions Start Date   fexofenadine-pseudoephedrine 180-240 MG per 24 hr tablet  Commonly known as:  ALLEGRA-D 24   1 tablet, Oral, Daily      fluticasone 50 MCG/ACT nasal spray  Commonly known as:  FLONASE   2 sprays, Nasal, Daily      lisinopril 20 MG tablet  Commonly known as:  PRINIVIL,ZESTRIL   20 mg, Oral, Daily      meloxicam 15 MG tablet  Commonly known as:   MOBIC   15 mg, Oral, Daily      montelukast 10 MG tablet  Commonly known as:  SINGULAIR   10 mg, Oral, Every Morning      PARoxetine 20 MG tablet  Commonly known as:  PAXIL   20 mg, Oral, Every Morning             Discharge Diet:   Diet Instructions     Advance Diet As Tolerated             Activity at Discharge:   Activity Instructions     Activity as Tolerated             Follow-up Appointments  No future appointments.  Additional Instructions for the Follow-ups that You Need to Schedule     Call MD With Problems / Concerns    As directed      Instructions: call or come in if any fever/chills, nausea/vomiting, new/worsening rectal pain or pressure, new/worsening anorectal bleeding or drainage (especially if malodorous), issues with bowel/bladder function, or any other concerning symptoms    Order Comments:  Instructions: call or come in if any fever/chills, nausea/vomiting, new/worsening rectal pain or pressure, new/worsening anorectal bleeding or drainage (especially if malodorous), issues with bowel/bladder function, or any other concerning symptoms                Discharge Diet:   As tolerated  Activity at Discharge:   As tolerated  Follow-up Appointments  6/22/2018 Dr. Fitzgerald

## 2018-06-21 NOTE — PAYOR COMM NOTE
"Purnima Ken  (48 y.o. Female)     PLEASE SEE ATTACHED DC SUMMARY    REF#[id number]    THANK YOU    KENYA OSHEA        Date of Birth Social Security Number Address Home Phone MRN    1970  [id number] [place] [address] [phone] [id number]    Restorationist Marital Status          Unknown Unknown       Admission Date Admission Type Admitting Provider Attending Provider Department, Room/Bed    6/18/18 Emergency Stacie Fitzgerald MD  [address], [facility]/1    Discharge Date Discharge Disposition Discharge Destination        6/20/2018 Home or Self Care              Attending Provider:  (none)   Allergies:  Penicillins, Sulfa Antibiotics    Isolation:  None   Infection:  None   Code Status:  Prior    Ht:  172.7 cm (68\")   Wt:  87.5 kg (193 lb)    Admission Cmt:  None   Principal Problem:  Perirectal abscess [K61.1] More...                 Active Insurance as of 6/18/2018     Primary Coverage     Payor Plan Insurance Group Employer/Plan Group    [employer] [facility] [employer]AN [id number]     Payor Plan Address Payor Plan Phone Number Effective From Effective To    [address] [phone] 1/1/2018     [facility] [id number]       Subscriber Name Subscriber Birth Date Member ID       PURNIMA KEN 1970 [id number]                 Emergency Contacts      (Rel.) Home Phone Work Phone Mobile Phone    Angy Cross (Sister) [phone] -- --               Discharge Summary      Gina Spaulding PA-C at 6/20/2018 12:48 PM     Attestation signed by Stacie Fitzgerald MD at 6/21/2018  8:44 AM    Stacie MILLER MD,agree with the PA’s discharge summary                    Date of Admission: 6/18/2018  Date of Discharge: 6/20/2018     Presenting Problem/History of Present Illness  Perirectal abscess [K61.1]  Perirectal abscess [K61.1]     Discharge Diagnosis:   Past Medical History:   Diagnosis Date   • Hypertension        Procedures Performed  Procedure(s):  Incision and Drainage of " Perirectal Abscess with drain placement      Hospital Course  Patient is a 48 y.o. female presented to ED with large perirectal abscess.  Emergent incision and drainage of perirectal abscess with multiple drain placement performed 6/18/2018 without incident; patient tolerated well.  She was also started on IV vanc & cefipime.    Patient's postoperative course unremarkable.  She was afebrile with VSS throughout admission.  White count normalized.  She had improved perianal erythema and induration, and drainage per drains.  Wound culture showed gram-positive cocci; anaerobic culture still pending as of discharge.  Patient discharged to home POD2 on po Bactrim.  Rx provided for pain.      Consults:   Consults     Date and Time Order Name Status Description    6/18/2018 1420 Surgery (on-call MD unless specified) Completed             Discharge Disposition  Home or Self Care    Discharge Medications     Discharge Medications      New Medications      Instructions Start Date   oxyCODONE-acetaminophen 5-325 MG per tablet  Commonly known as:  ROXICET   1-2 tabs po Q 6 hours prn pain       sulfamethoxazole-trimethoprim 800-160 MG per tablet  Commonly known as:  BACTRIM DS   1 tablet, Oral, 2 Times Daily         Continue These Medications      Instructions Start Date   fexofenadine-pseudoephedrine 180-240 MG per 24 hr tablet  Commonly known as:  ALLEGRA-D 24   1 tablet, Oral, Daily      fluticasone 50 MCG/ACT nasal spray  Commonly known as:  FLONASE   2 sprays, Nasal, Daily      lisinopril 20 MG tablet  Commonly known as:  PRINIVIL,ZESTRIL   20 mg, Oral, Daily      meloxicam 15 MG tablet  Commonly known as:  MOBIC   15 mg, Oral, Daily      montelukast 10 MG tablet  Commonly known as:  SINGULAIR   10 mg, Oral, Every Morning      PARoxetine 20 MG tablet  Commonly known as:  PAXIL   20 mg, Oral, Every Morning             Discharge Diet:   Diet Instructions     Advance Diet As Tolerated             Activity at Discharge:    Activity Instructions     Activity as Tolerated             Follow-up Appointments  No future appointments.  Additional Instructions for the Follow-ups that You Need to Schedule     Call MD With Problems / Concerns    As directed      Instructions: call or come in if any fever/chills, nausea/vomiting, new/worsening rectal pain or pressure, new/worsening anorectal bleeding or drainage (especially if malodorous), issues with bowel/bladder function, or any other concerning symptoms    Order Comments:  Instructions: call or come in if any fever/chills, nausea/vomiting, new/worsening rectal pain or pressure, new/worsening anorectal bleeding or drainage (especially if malodorous), issues with bowel/bladder function, or any other concerning symptoms                Discharge Diet:   As tolerated  Activity at Discharge:   As tolerated  Follow-up Appointments  6/22/2018 Dr. Fitzgerald    Electronically signed by Stacie Fitzgerald MD at 6/21/2018  8:44 AM

## 2018-06-22 ENCOUNTER — OFFICE VISIT (OUTPATIENT)
Dept: SURGERY | Facility: CLINIC | Age: 48
End: 2018-06-22

## 2018-06-22 VITALS
SYSTOLIC BLOOD PRESSURE: 142 MMHG | RESPIRATION RATE: 20 BRPM | HEART RATE: 76 BPM | DIASTOLIC BLOOD PRESSURE: 90 MMHG | HEIGHT: 68 IN | WEIGHT: 195.8 LBS | OXYGEN SATURATION: 98 % | BODY MASS INDEX: 29.67 KG/M2 | TEMPERATURE: 98.1 F

## 2018-06-22 DIAGNOSIS — K61.1 PERIRECTAL ABSCESS: Primary | ICD-10-CM

## 2018-06-22 LAB
BACTERIA SPEC AEROBE CULT: ABNORMAL
BACTERIA SPEC AEROBE CULT: ABNORMAL
GRAM STN SPEC: ABNORMAL

## 2018-06-22 PROCEDURE — 99024 POSTOP FOLLOW-UP VISIT: CPT | Performed by: COLON & RECTAL SURGERY

## 2018-06-22 RX ORDER — LISINOPRIL 10 MG/1
10 TABLET ORAL EVERY MORNING
Refills: 4 | COMMUNITY
Start: 2018-06-09

## 2018-06-22 RX ORDER — LEVOFLOXACIN 500 MG/1
500 TABLET, FILM COATED ORAL DAILY
Qty: 5 TABLET | Refills: 0 | Status: SHIPPED | OUTPATIENT
Start: 2018-06-22 | End: 2018-06-27

## 2018-06-22 NOTE — PROGRESS NOTES
"Chante Harrell is a 48 y.o. female in for follow up of Perirectal abscess   s/p incision and drainage of perirectal abscess with multiple drain placement 6/18/2018     Pt states that her bottom feels sore  She still has some pressure, and stitches hurt    Lots of drainage per rectum    No fevers  She does feel a little shaky, which she thinks is due to her lack of acticity    /90 (BP Location: Left arm, Patient Position: Sitting, Cuff Size: Adult)   Pulse 76   Temp 98.1 °F (36.7 °C)   Resp 20   Ht 172.7 cm (68\")   Wt 88.8 kg (195 lb 12.8 oz)   SpO2 98%   BMI 29.77 kg/m²   Body mass index is 29.77 kg/m².      PE:  Physical Exam   Constitutional: She appears well-developed. No distress.   HENT:   Head: Normocephalic and atraumatic.   Abdominal: Soft. She exhibits no distension.   Genitourinary:   Genitourinary Comments: Perianal exam: external: pezzer drain in place and penrose drain in place, both with small amount serous drainage.  Perianal induration and erythema improved   Musculoskeletal: Normal range of motion.   Neurological: She is alert.   Psychiatric: Thought content normal.     Wound cx: Strep agalactiae (Group B) & Strep mitis/oralis    Assessment:   1. Perirectal abscess    s/p incision and drainage of perirectal abscess with multiple drain placement 6/18/2018      Plan:    D/c'ed penrose drain in-office; pt tolerated well.  Will plan to d/c pezzer drain at next visit.    Discussed culture results.  Will change abx to Levaquin.  Discussed possible tendon injury and recommended pt not perform any strenuous activity.    Call or come in if f/c or other concerning symptoms    RTC 4 days      Scribed for Stacie Fitzgerald MD by Gina Spaulding PA-C 6/22/2018  This patient was evaluated by me, recommendations made, documentation reviewed, edited, and revised by me, Stacie Fitzgerald MD        "

## 2018-06-25 LAB — BACTERIA SPEC ANAEROBE CULT: ABNORMAL

## 2018-06-26 ENCOUNTER — OFFICE VISIT (OUTPATIENT)
Dept: SURGERY | Facility: CLINIC | Age: 48
End: 2018-06-26

## 2018-06-26 VITALS
SYSTOLIC BLOOD PRESSURE: 125 MMHG | WEIGHT: 188 LBS | OXYGEN SATURATION: 98 % | BODY MASS INDEX: 28.49 KG/M2 | HEART RATE: 81 BPM | HEIGHT: 68 IN | TEMPERATURE: 98 F | DIASTOLIC BLOOD PRESSURE: 80 MMHG

## 2018-06-26 DIAGNOSIS — K61.1 PERIRECTAL ABSCESS: Primary | ICD-10-CM

## 2018-06-26 PROCEDURE — 99024 POSTOP FOLLOW-UP VISIT: CPT | Performed by: COLON & RECTAL SURGERY

## 2018-06-26 NOTE — PROGRESS NOTES
"Chante Harrell is a 48 y.o. female in for follow up of Perirectal abscess  s/p incision and drainage of perirectal abscess with multiple drain placement 6/18/2018     Pt states she is doing well    She is still having drainage    Recticare helpful for discomfort    She has 1 more day abx  Still feeling shaky and sweaty sometimes      /80   Pulse 81   Temp 98 °F (36.7 °C)   Ht 172.7 cm (68\")   Wt 85.3 kg (188 lb)   SpO2 98%   BMI 28.59 kg/m²   Body mass index is 28.59 kg/m².      PE:  Physical Exam   Constitutional: She appears well-developed. No distress.   HENT:   Head: Normocephalic and atraumatic.   Abdominal: Soft. She exhibits no distension.   Genitourinary:   Genitourinary Comments: Perianal exam: external: pezzer drain in place left posterior with small amount thin yellow-red drainage.  Perianal induration and erythema greatly improved   Musculoskeletal: Normal range of motion.   Neurological: She is alert.   Psychiatric: Thought content normal.         Assessment:   1. Perirectal abscess     s/p incision and drainage of perirectal abscess with multiple drain placement 6/18/2018     Plan:    -pezzer drain removed in-office; pt tolerated well  -she can continue to use recticare  -if shakiness and sweating do not resolve after she finishes abx, will consider referral to GYN  -note for RTW 2 July 2018    Call or come in if any questions or concerns    RTC 10 days      Scribed for Staice Fitzgerald MD by Gina Spaulding PA-C 6/26/2018  This patient was evaluated by me, recommendations made, documentation reviewed, edited, and revised by me, Stacie Fitzgerald MD        "

## 2018-07-09 ENCOUNTER — OFFICE VISIT (OUTPATIENT)
Dept: SURGERY | Facility: CLINIC | Age: 48
End: 2018-07-09

## 2018-07-09 VITALS
TEMPERATURE: 98.1 F | WEIGHT: 188.7 LBS | HEIGHT: 68 IN | DIASTOLIC BLOOD PRESSURE: 86 MMHG | SYSTOLIC BLOOD PRESSURE: 142 MMHG | HEART RATE: 91 BPM | RESPIRATION RATE: 20 BRPM | BODY MASS INDEX: 28.6 KG/M2 | OXYGEN SATURATION: 99 %

## 2018-07-09 DIAGNOSIS — K61.1 PERIRECTAL ABSCESS: Primary | ICD-10-CM

## 2018-07-09 PROCEDURE — 99024 POSTOP FOLLOW-UP VISIT: CPT | Performed by: COLON & RECTAL SURGERY

## 2018-07-09 NOTE — PROGRESS NOTES
"Chante Harrell is a 48 y.o. female in for follow up of Perirectal abscess  s/p incision and drainage of perirectal abscess with multiple drain placement 6/18/2018     Pt states that the drainage has stopped    She started having some pain earlier today    No change in BMs    Most recent cy several years ago when evaluating for IBD    /86 (BP Location: Left arm, Patient Position: Sitting, Cuff Size: Adult)   Pulse 91   Temp 98.1 °F (36.7 °C)   Resp 20   Ht 172.7 cm (68\")   Wt 85.6 kg (188 lb 11.2 oz)   SpO2 99%   BMI 28.69 kg/m²   Body mass index is 28.69 kg/m².      PE:  Physical Exam   Constitutional: She appears well-developed. No distress.   HENT:   Head: Normocephalic and atraumatic.   Abdominal: Soft. She exhibits no distension.   Genitourinary:   Genitourinary Comments: Perianal exam: external: left anterior firm tissue; no overlying erythema.  No blood or drainage  Drain sites healing well   Musculoskeletal: Normal range of motion.   Neurological: She is alert.   Psychiatric: Thought content normal.         Assessment:   1. Perirectal abscess     s/p incision and drainage of perirectal abscess with multiple drain placement 6/18/2018     Plan:    -incised indurated area; no acute abscess today.  Drain sites healing well    She will check if FamHx colon polyps to plan for colonoscopy      RTC 2 weeks        Procedure: incision perianal firm tissue    Patient gave informed consent verbally.  Patient was prepped with Betadine.  1% lidocaine with epinephrine and 0.25 percent Marcaine plain was used as a local infiltration.  Using 11-blade, made incision at area of greatest firmness, which was left anterior perianal.  No purulence encountered.  Used 18-gauge needle to check for aspiration of purulence; none returned.    Patient tolerated well.      Scribed for Stacie Fitzgerald MD by Gina Spaulding PA-C 7/9/2018  This patient was evaluated by me, recommendations made, documentation reviewed, edited, and " revised by me, Stacie Fitzgerald MD

## 2018-07-25 ENCOUNTER — OFFICE VISIT (OUTPATIENT)
Dept: SURGERY | Facility: CLINIC | Age: 48
End: 2018-07-25

## 2018-07-25 VITALS
BODY MASS INDEX: 28.49 KG/M2 | RESPIRATION RATE: 18 BRPM | HEART RATE: 78 BPM | OXYGEN SATURATION: 97 % | WEIGHT: 188 LBS | SYSTOLIC BLOOD PRESSURE: 125 MMHG | TEMPERATURE: 98 F | DIASTOLIC BLOOD PRESSURE: 80 MMHG | HEIGHT: 68 IN

## 2018-07-25 DIAGNOSIS — K60.4 PERIRECTAL FISTULA: ICD-10-CM

## 2018-07-25 DIAGNOSIS — K61.1 PERIRECTAL ABSCESS: Primary | ICD-10-CM

## 2018-07-25 PROBLEM — K60.40 PERIRECTAL FISTULA: Status: ACTIVE | Noted: 2018-07-25

## 2018-07-25 PROCEDURE — 99024 POSTOP FOLLOW-UP VISIT: CPT | Performed by: COLON & RECTAL SURGERY

## 2018-07-25 NOTE — PROGRESS NOTES
"Chante Harrell is a 48 y.o. female in for follow up s/p incision and drainage of perirectal abscess with multiple drain placement 6/18/2018    Pt states she is still having some drainage from her bottom  She changes pad 3 times per day  Not a lot on the pad but consistant  No f/c    /80   Pulse 78   Temp 98 °F (36.7 °C)   Resp 18   Ht 172.7 cm (68\")   Wt 85.3 kg (188 lb)   SpO2 97%   BMI 28.59 kg/m²   Body mass index is 28.59 kg/m².      PE:  Physical Exam   Constitutional: She appears well-developed. No distress.   HENT:   Head: Normocephalic and atraumatic.   Abdominal: Soft. She exhibits no distension.   Genitourinary:   Genitourinary Comments: Perianal exam: external: left lateral point opening draining purulent fluid.  Tracks to rectum.  When external pressure applied, +purulent drainage per rectum   Musculoskeletal: Normal range of motion.   Neurological: She is alert.   Psychiatric: Thought content normal.         Assessment:   1. Perirectal abscess    2. Perirectal fistula     s/p incision and drainage of perirectal abscess with multiple drain placement 6/18/2018    Plan:    I recommended to do an incision and drainage of chronic perirectal abscess with possible fistulotomy.  I described  the risks being bleeding, infection, fecal incontinence, the chance of not finding an internal opening, and recurrence.  We discussed benefits and alternatives.  I discussed the typical operative experience and postoperative recovery.  Patient expresses understanding and wishes to proceed.      Scribed for Stacie Fitzgerald MD by Gina Spaulding PA-C 7/25/2018  This patient was evaluated by me, recommendations made, documentation reviewed, edited, and revised by me, Stacie Fitzgerald MD        "

## 2018-07-26 ENCOUNTER — APPOINTMENT (OUTPATIENT)
Dept: PREADMISSION TESTING | Facility: HOSPITAL | Age: 48
End: 2018-07-26

## 2018-07-26 VITALS
OXYGEN SATURATION: 99 % | DIASTOLIC BLOOD PRESSURE: 78 MMHG | HEART RATE: 64 BPM | HEIGHT: 68 IN | TEMPERATURE: 98.3 F | SYSTOLIC BLOOD PRESSURE: 115 MMHG | WEIGHT: 193 LBS | RESPIRATION RATE: 20 BRPM | BODY MASS INDEX: 29.25 KG/M2

## 2018-07-26 LAB
ANION GAP SERPL CALCULATED.3IONS-SCNC: 12.3 MMOL/L
BUN BLD-MCNC: 18 MG/DL (ref 6–20)
BUN/CREAT SERPL: 24.7 (ref 7–25)
CALCIUM SPEC-SCNC: 9.4 MG/DL (ref 8.6–10.5)
CHLORIDE SERPL-SCNC: 103 MMOL/L (ref 98–107)
CO2 SERPL-SCNC: 24.7 MMOL/L (ref 22–29)
CREAT BLD-MCNC: 0.73 MG/DL (ref 0.57–1)
DEPRECATED RDW RBC AUTO: 43.3 FL (ref 37–54)
ERYTHROCYTE [DISTWIDTH] IN BLOOD BY AUTOMATED COUNT: 12.5 % (ref 11.7–13)
GFR SERPL CREATININE-BSD FRML MDRD: 85 ML/MIN/1.73
GLUCOSE BLD-MCNC: 89 MG/DL (ref 65–99)
HCG SERPL QL: NEGATIVE
HCT VFR BLD AUTO: 42.6 % (ref 35.6–45.5)
HGB BLD-MCNC: 13.5 G/DL (ref 11.9–15.5)
MCH RBC QN AUTO: 30.3 PG (ref 26.9–32)
MCHC RBC AUTO-ENTMCNC: 31.7 G/DL (ref 32.4–36.3)
MCV RBC AUTO: 95.7 FL (ref 80.5–98.2)
PLATELET # BLD AUTO: 236 10*3/MM3 (ref 140–500)
PMV BLD AUTO: 10 FL (ref 6–12)
POTASSIUM BLD-SCNC: 4.5 MMOL/L (ref 3.5–5.2)
RBC # BLD AUTO: 4.45 10*6/MM3 (ref 3.9–5.2)
SODIUM BLD-SCNC: 140 MMOL/L (ref 136–145)
WBC NRBC COR # BLD: 4.76 10*3/MM3 (ref 4.5–10.7)

## 2018-07-26 PROCEDURE — 36415 COLL VENOUS BLD VENIPUNCTURE: CPT

## 2018-07-26 PROCEDURE — 93010 ELECTROCARDIOGRAM REPORT: CPT | Performed by: INTERNAL MEDICINE

## 2018-07-26 PROCEDURE — 84703 CHORIONIC GONADOTROPIN ASSAY: CPT | Performed by: COLON & RECTAL SURGERY

## 2018-07-26 PROCEDURE — 93005 ELECTROCARDIOGRAM TRACING: CPT

## 2018-07-26 PROCEDURE — 80048 BASIC METABOLIC PNL TOTAL CA: CPT | Performed by: COLON & RECTAL SURGERY

## 2018-07-26 PROCEDURE — 85027 COMPLETE CBC AUTOMATED: CPT | Performed by: COLON & RECTAL SURGERY

## 2018-07-26 RX ORDER — FEXOFENADINE HCL 180 MG/1
180 TABLET ORAL DAILY
COMMUNITY

## 2018-07-26 RX ORDER — IBUPROFEN 200 MG
200 TABLET ORAL EVERY 6 HOURS PRN
COMMUNITY

## 2018-07-26 RX ORDER — COVID-19 ANTIGEN TEST
440 KIT MISCELLANEOUS EVERY 6 HOURS PRN
COMMUNITY
End: 2018-10-21

## 2018-07-26 NOTE — DISCHARGE INSTRUCTIONS
Take the following medications the morning of surgery with a small sip of water:    none    General Instructions:  • Do not eat solid food after midnight the night before surgery.  • You may drink clear liquids day of surgery but must stop at least one hour before your hospital arrival time.  • It is beneficial for you to have a clear drink that contains carbohydrates the day of surgery.  We suggest a 12 to 20 ounce bottle of Gatorade or Powerade for non-diabetic patients or a 12 to 20 ounce bottle of G2 or Powerade Zero for diabetic patients. (Pediatric patients, are not advised to drink a 12 to 20 ounce carbohydrate drink)    Clear liquids are liquids you can see through.  Nothing red in color.     Plain water                               Sports drinks  Sodas                                   Gelatin (Jell-O)  Fruit juices without pulp such as white grape juice and apple juice  Popsicles that contain no fruit or yogurt  Tea or coffee (no cream or milk added)  Gatorade / Powerade  G2 / Powerade Zero    • Infants may have breast milk up to four hours before surgery.  • Infants drinking formula may drink formula up to six hours before surgery.   • Patients who avoid smoking, chewing tobacco and alcohol for 4 weeks prior to surgery have a reduced risk of post-operative complications.  Quit smoking as many days before surgery as you can.  • Do not smoke, use chewing tobacco or drink alcohol the day of surgery.   • If applicable bring your C-PAP/ BI-PAP machine.  • Bring any papers given to you in the doctor’s office.  • Wear clean comfortable clothes and socks.  • Do not wear contact lenses or make-up.  Bring a case for your glasses.   • Bring crutches or walker if applicable.  • Remove all piercings.  Leave jewelry and any other valuables at home.  • Hair extensions with metal clips must be removed prior to surgery.  • The Pre-Admission Testing nurse will instruct you to bring medications if unable to obtain an  accurate list in Pre-Admission Testing.        If you were given a blood bank ID arm band remember to bring it with you the day of surgery.    Preventing a Surgical Site Infection:  • For 2 to 3 days before surgery, avoid shaving with a razor because the razor can irritate skin and make it easier to develop an infection.    • Any areas of open skin can increase the risk of a post-operative wound infection by allowing bacteria to enter and travel throughout the body.  Notify your surgeon if you have any skin wounds / rashes even if it is not near the expected surgical site.  The area will need assessed to determine if surgery should be delayed until it is healed.  • The night prior to surgery sleep in a clean bed with clean clothing.  Do not allow pets to sleep with you.  • Shower on the morning of surgery using a fresh bar of anti-bacterial soap (such as Dial) and clean washcloth.  Dry with a clean towel and dress in clean clothing.  • Ask your surgeon if you will be receiving antibiotics prior to surgery.  • Make sure you, your family, and all healthcare providers clean their hands with soap and water or an alcohol based hand  before caring for you or your wound.    Day of surgery:7/27/18   0530  Upon arrival, a Pre-op nurse and Anesthesiologist will review your health history, obtain vital signs, and answer questions you may have.  The only belongings needed at this time will be your home medications and if applicable your C-PAP/BI-PAP machine.  If you are staying overnight your family can leave the rest of your belongings in the car and bring them to your room later.  A Pre-op nurse will start an IV and you may receive medication in preparation for surgery, including something to help you relax.  Your family will be able to see you in the Pre-op area.  While you are in surgery your family should notify the waiting room  if they leave the waiting room area and provide a contact phone  number.    Please be aware that surgery does come with discomfort.  We want to make every effort to control your discomfort so please discuss any uncontrolled symptoms with your nurse.   Your doctor will most likely have prescribed pain medications.      If you are going home after surgery you will receive individualized written care instructions before being discharged.  A responsible adult must drive you to and from the hospital on the day of your surgery and stay with you for 24 hours.    If you are staying overnight following surgery, you will be transported to your hospital room following the recovery period.  [facility] has all private rooms.    You have received a list of surgical assistants for your reference.  If you have any questions please call Pre-Admission Testing at [phone].  Deductibles and co-payments are collected on the day of service. Please be prepared to pay the required co-pay, deductible or deposit on the day of service as defined by your plan.

## 2018-07-27 ENCOUNTER — ANESTHESIA (OUTPATIENT)
Dept: PERIOP | Facility: HOSPITAL | Age: 48
End: 2018-07-27

## 2018-07-27 ENCOUNTER — ANESTHESIA EVENT (OUTPATIENT)
Dept: PERIOP | Facility: HOSPITAL | Age: 48
End: 2018-07-27

## 2018-07-27 ENCOUNTER — HOSPITAL ENCOUNTER (OUTPATIENT)
Facility: HOSPITAL | Age: 48
Setting detail: HOSPITAL OUTPATIENT SURGERY
Discharge: HOME | End: 2018-07-27
Attending: COLON & RECTAL SURGERY | Admitting: ANESTHESIOLOGY

## 2018-07-27 VITALS
WEIGHT: 192.3 LBS | OXYGEN SATURATION: 92 % | TEMPERATURE: 98.2 F | RESPIRATION RATE: 16 BRPM | SYSTOLIC BLOOD PRESSURE: 125 MMHG | HEART RATE: 87 BPM | HEIGHT: 68 IN | DIASTOLIC BLOOD PRESSURE: 81 MMHG | BODY MASS INDEX: 29.15 KG/M2

## 2018-07-27 DIAGNOSIS — K60.4 PERIRECTAL FISTULA: ICD-10-CM

## 2018-07-27 PROCEDURE — 25010000002 ONDANSETRON PER 1 MG: Performed by: NURSE ANESTHETIST, CERTIFIED REGISTERED

## 2018-07-27 PROCEDURE — 25010000002 DEXAMETHASONE PER 1 MG: Performed by: NURSE ANESTHETIST, CERTIFIED REGISTERED

## 2018-07-27 PROCEDURE — 46060 I&D ISCHIORECTAL/NTRMRL ABSC: CPT | Performed by: COLON & RECTAL SURGERY

## 2018-07-27 PROCEDURE — 25010000002 LEVOFLOXACIN PER 250 MG: Performed by: COLON & RECTAL SURGERY

## 2018-07-27 PROCEDURE — 25010000002 FENTANYL CITRATE (PF) 100 MCG/2ML SOLUTION: Performed by: NURSE ANESTHETIST, CERTIFIED REGISTERED

## 2018-07-27 PROCEDURE — 25010000002 KETOROLAC TROMETHAMINE PER 15 MG: Performed by: NURSE ANESTHETIST, CERTIFIED REGISTERED

## 2018-07-27 PROCEDURE — 25010000002 PROPOFOL 10 MG/ML EMULSION: Performed by: NURSE ANESTHETIST, CERTIFIED REGISTERED

## 2018-07-27 PROCEDURE — 88304 TISSUE EXAM BY PATHOLOGIST: CPT | Performed by: COLON & RECTAL SURGERY

## 2018-07-27 RX ORDER — LIDOCAINE 50 MG/G
OINTMENT TOPICAL EVERY 4 HOURS PRN
Qty: 1 TUBE | Refills: 5 | Status: SHIPPED | OUTPATIENT
Start: 2018-07-27 | End: 2018-08-08

## 2018-07-27 RX ORDER — LIDOCAINE HYDROCHLORIDE 20 MG/ML
INJECTION, SOLUTION INFILTRATION; PERINEURAL AS NEEDED
Status: DISCONTINUED | OUTPATIENT
Start: 2018-07-27 | End: 2018-07-27 | Stop reason: SURG

## 2018-07-27 RX ORDER — SODIUM CHLORIDE, SODIUM LACTATE, POTASSIUM CHLORIDE, CALCIUM CHLORIDE 600; 310; 30; 20 MG/100ML; MG/100ML; MG/100ML; MG/100ML
9 INJECTION, SOLUTION INTRAVENOUS CONTINUOUS
Status: DISCONTINUED | OUTPATIENT
Start: 2018-07-27 | End: 2018-07-27 | Stop reason: HOSPADM

## 2018-07-27 RX ORDER — LEVOFLOXACIN 5 MG/ML
500 INJECTION, SOLUTION INTRAVENOUS EVERY 24 HOURS
Status: COMPLETED | OUTPATIENT
Start: 2018-07-27 | End: 2018-07-27

## 2018-07-27 RX ORDER — DEXAMETHASONE SODIUM PHOSPHATE 10 MG/ML
INJECTION INTRAMUSCULAR; INTRAVENOUS AS NEEDED
Status: DISCONTINUED | OUTPATIENT
Start: 2018-07-27 | End: 2018-07-27 | Stop reason: SURG

## 2018-07-27 RX ORDER — FLUMAZENIL 0.1 MG/ML
0.2 INJECTION INTRAVENOUS AS NEEDED
Status: DISCONTINUED | OUTPATIENT
Start: 2018-07-27 | End: 2018-07-27 | Stop reason: HOSPADM

## 2018-07-27 RX ORDER — KETOROLAC TROMETHAMINE 30 MG/ML
INJECTION, SOLUTION INTRAMUSCULAR; INTRAVENOUS AS NEEDED
Status: DISCONTINUED | OUTPATIENT
Start: 2018-07-27 | End: 2018-07-27 | Stop reason: SURG

## 2018-07-27 RX ORDER — FENTANYL CITRATE 50 UG/ML
50 INJECTION, SOLUTION INTRAMUSCULAR; INTRAVENOUS
Status: DISCONTINUED | OUTPATIENT
Start: 2018-07-27 | End: 2018-07-27 | Stop reason: HOSPADM

## 2018-07-27 RX ORDER — PROMETHAZINE HYDROCHLORIDE 25 MG/1
25 SUPPOSITORY RECTAL ONCE AS NEEDED
Status: DISCONTINUED | OUTPATIENT
Start: 2018-07-27 | End: 2018-07-27 | Stop reason: HOSPADM

## 2018-07-27 RX ORDER — PROMETHAZINE HYDROCHLORIDE 25 MG/1
12.5 TABLET ORAL ONCE AS NEEDED
Status: DISCONTINUED | OUTPATIENT
Start: 2018-07-27 | End: 2018-07-27 | Stop reason: HOSPADM

## 2018-07-27 RX ORDER — ONDANSETRON 2 MG/ML
INJECTION INTRAMUSCULAR; INTRAVENOUS AS NEEDED
Status: DISCONTINUED | OUTPATIENT
Start: 2018-07-27 | End: 2018-07-27 | Stop reason: SURG

## 2018-07-27 RX ORDER — ONDANSETRON 4 MG/1
4 TABLET, FILM COATED ORAL ONCE AS NEEDED
Status: DISCONTINUED | OUTPATIENT
Start: 2018-07-27 | End: 2018-07-27 | Stop reason: HOSPADM

## 2018-07-27 RX ORDER — OXYCODONE HYDROCHLORIDE AND ACETAMINOPHEN 5; 325 MG/1; MG/1
TABLET ORAL
Qty: 36 TABLET | Refills: 0 | Status: SHIPPED | OUTPATIENT
Start: 2018-07-27 | End: 2018-08-08

## 2018-07-27 RX ORDER — OXYCODONE AND ACETAMINOPHEN 7.5; 325 MG/1; MG/1
1 TABLET ORAL ONCE AS NEEDED
Status: COMPLETED | OUTPATIENT
Start: 2018-07-27 | End: 2018-07-27

## 2018-07-27 RX ORDER — OXYCODONE HYDROCHLORIDE AND ACETAMINOPHEN 5; 325 MG/1; MG/1
2 TABLET ORAL ONCE AS NEEDED
Status: DISCONTINUED | OUTPATIENT
Start: 2018-07-27 | End: 2018-07-27 | Stop reason: HOSPADM

## 2018-07-27 RX ORDER — LABETALOL HYDROCHLORIDE 5 MG/ML
5 INJECTION, SOLUTION INTRAVENOUS
Status: DISCONTINUED | OUTPATIENT
Start: 2018-07-27 | End: 2018-07-27 | Stop reason: HOSPADM

## 2018-07-27 RX ORDER — ONDANSETRON 2 MG/ML
4 INJECTION INTRAMUSCULAR; INTRAVENOUS ONCE AS NEEDED
Status: DISCONTINUED | OUTPATIENT
Start: 2018-07-27 | End: 2018-07-27 | Stop reason: HOSPADM

## 2018-07-27 RX ORDER — PROPOFOL 10 MG/ML
VIAL (ML) INTRAVENOUS AS NEEDED
Status: DISCONTINUED | OUTPATIENT
Start: 2018-07-27 | End: 2018-07-27 | Stop reason: SURG

## 2018-07-27 RX ORDER — HYDROCODONE BITARTRATE AND ACETAMINOPHEN 7.5; 325 MG/1; MG/1
1 TABLET ORAL ONCE AS NEEDED
Status: DISCONTINUED | OUTPATIENT
Start: 2018-07-27 | End: 2018-07-27 | Stop reason: HOSPADM

## 2018-07-27 RX ORDER — LIDOCAINE HYDROCHLORIDE 10 MG/ML
0.5 INJECTION, SOLUTION EPIDURAL; INFILTRATION; INTRACAUDAL; PERINEURAL ONCE AS NEEDED
Status: DISCONTINUED | OUTPATIENT
Start: 2018-07-27 | End: 2018-07-27 | Stop reason: HOSPADM

## 2018-07-27 RX ORDER — NALOXONE HCL 0.4 MG/ML
0.2 VIAL (ML) INJECTION AS NEEDED
Status: DISCONTINUED | OUTPATIENT
Start: 2018-07-27 | End: 2018-07-27 | Stop reason: HOSPADM

## 2018-07-27 RX ORDER — PROMETHAZINE HYDROCHLORIDE 25 MG/ML
12.5 INJECTION, SOLUTION INTRAMUSCULAR; INTRAVENOUS ONCE AS NEEDED
Status: DISCONTINUED | OUTPATIENT
Start: 2018-07-27 | End: 2018-07-27 | Stop reason: HOSPADM

## 2018-07-27 RX ORDER — HYDROMORPHONE HYDROCHLORIDE 1 MG/ML
0.5 INJECTION, SOLUTION INTRAMUSCULAR; INTRAVENOUS; SUBCUTANEOUS
Status: DISCONTINUED | OUTPATIENT
Start: 2018-07-27 | End: 2018-07-27 | Stop reason: HOSPADM

## 2018-07-27 RX ORDER — FAMOTIDINE 10 MG/ML
INJECTION, SOLUTION INTRAVENOUS
Status: COMPLETED
Start: 2018-07-27 | End: 2018-07-27

## 2018-07-27 RX ORDER — MIDAZOLAM HYDROCHLORIDE 1 MG/ML
1 INJECTION INTRAMUSCULAR; INTRAVENOUS
Status: DISCONTINUED | OUTPATIENT
Start: 2018-07-27 | End: 2018-07-27 | Stop reason: HOSPADM

## 2018-07-27 RX ORDER — EPHEDRINE SULFATE 50 MG/ML
5 INJECTION, SOLUTION INTRAVENOUS ONCE AS NEEDED
Status: DISCONTINUED | OUTPATIENT
Start: 2018-07-27 | End: 2018-07-27 | Stop reason: HOSPADM

## 2018-07-27 RX ORDER — ROCURONIUM BROMIDE 10 MG/ML
INJECTION, SOLUTION INTRAVENOUS AS NEEDED
Status: DISCONTINUED | OUTPATIENT
Start: 2018-07-27 | End: 2018-07-27 | Stop reason: SURG

## 2018-07-27 RX ORDER — MIDAZOLAM HYDROCHLORIDE 1 MG/ML
2 INJECTION INTRAMUSCULAR; INTRAVENOUS
Status: DISCONTINUED | OUTPATIENT
Start: 2018-07-27 | End: 2018-07-27 | Stop reason: HOSPADM

## 2018-07-27 RX ORDER — FAMOTIDINE 10 MG/ML
20 INJECTION, SOLUTION INTRAVENOUS ONCE
Status: COMPLETED | OUTPATIENT
Start: 2018-07-27 | End: 2018-07-27

## 2018-07-27 RX ORDER — SODIUM CHLORIDE 0.9 % (FLUSH) 0.9 %
1-10 SYRINGE (ML) INJECTION AS NEEDED
Status: DISCONTINUED | OUTPATIENT
Start: 2018-07-27 | End: 2018-07-27 | Stop reason: HOSPADM

## 2018-07-27 RX ORDER — PROMETHAZINE HYDROCHLORIDE 25 MG/1
25 TABLET ORAL ONCE AS NEEDED
Status: DISCONTINUED | OUTPATIENT
Start: 2018-07-27 | End: 2018-07-27 | Stop reason: HOSPADM

## 2018-07-27 RX ORDER — DIPHENHYDRAMINE HYDROCHLORIDE 50 MG/ML
12.5 INJECTION INTRAMUSCULAR; INTRAVENOUS
Status: DISCONTINUED | OUTPATIENT
Start: 2018-07-27 | End: 2018-07-27 | Stop reason: HOSPADM

## 2018-07-27 RX ORDER — FENTANYL CITRATE 50 UG/ML
INJECTION, SOLUTION INTRAMUSCULAR; INTRAVENOUS AS NEEDED
Status: DISCONTINUED | OUTPATIENT
Start: 2018-07-27 | End: 2018-07-27 | Stop reason: SURG

## 2018-07-27 RX ADMIN — DEXAMETHASONE SODIUM PHOSPHATE 6 MG: 10 INJECTION INTRAMUSCULAR; INTRAVENOUS at 07:16

## 2018-07-27 RX ADMIN — SUGAMMADEX 160 MG: 100 INJECTION, SOLUTION INTRAVENOUS at 07:38

## 2018-07-27 RX ADMIN — FENTANYL CITRATE 50 MCG: 50 INJECTION INTRAMUSCULAR; INTRAVENOUS at 07:04

## 2018-07-27 RX ADMIN — ONDANSETRON 4 MG: 2 INJECTION INTRAMUSCULAR; INTRAVENOUS at 07:37

## 2018-07-27 RX ADMIN — PROPOFOL 200 MG: 10 INJECTION, EMULSION INTRAVENOUS at 07:05

## 2018-07-27 RX ADMIN — FAMOTIDINE 20 MG: 10 INJECTION, SOLUTION INTRAVENOUS at 06:41

## 2018-07-27 RX ADMIN — SODIUM CHLORIDE, POTASSIUM CHLORIDE, SODIUM LACTATE AND CALCIUM CHLORIDE 9 ML/HR: 600; 310; 30; 20 INJECTION, SOLUTION INTRAVENOUS at 08:49

## 2018-07-27 RX ADMIN — SODIUM CHLORIDE, POTASSIUM CHLORIDE, SODIUM LACTATE AND CALCIUM CHLORIDE 9 ML/HR: 600; 310; 30; 20 INJECTION, SOLUTION INTRAVENOUS at 06:41

## 2018-07-27 RX ADMIN — LIDOCAINE HYDROCHLORIDE 60 MG: 20 INJECTION, SOLUTION INFILTRATION; PERINEURAL at 07:05

## 2018-07-27 RX ADMIN — KETOROLAC TROMETHAMINE 30 MG: 30 INJECTION, SOLUTION INTRAMUSCULAR; INTRAVENOUS at 07:37

## 2018-07-27 RX ADMIN — FENTANYL CITRATE 50 MCG: 50 INJECTION INTRAMUSCULAR; INTRAVENOUS at 07:47

## 2018-07-27 RX ADMIN — FAMOTIDINE 20 MG: 10 INJECTION INTRAVENOUS at 06:41

## 2018-07-27 RX ADMIN — LEVOFLOXACIN 500 MG: 5 INJECTION, SOLUTION INTRAVENOUS at 06:55

## 2018-07-27 RX ADMIN — ROCURONIUM BROMIDE 30 MG: 10 INJECTION INTRAVENOUS at 07:05

## 2018-07-27 RX ADMIN — FENTANYL CITRATE 50 MCG: 50 INJECTION INTRAMUSCULAR; INTRAVENOUS at 07:35

## 2018-07-27 RX ADMIN — FENTANYL CITRATE 50 MCG: 50 INJECTION INTRAMUSCULAR; INTRAVENOUS at 07:23

## 2018-07-27 RX ADMIN — OXYCODONE HYDROCHLORIDE AND ACETAMINOPHEN 1 TABLET: 7.5; 325 TABLET ORAL at 08:36

## 2018-07-27 NOTE — ANESTHESIA PREPROCEDURE EVALUATION
Anesthesia Evaluation     Patient summary reviewed and Nursing notes reviewed   NPO Solid Status: > 8 hours  NPO Liquid Status: > 4 hours           Airway   Mallampati: II  No difficulty expected  Dental      Comment: Caps upper front    Pulmonary     breath sounds clear to auscultation  (+) sleep apnea on CPAP,   Cardiovascular     Rhythm: regular    (+) hypertension,       Neuro/Psych  (+) headaches, psychiatric history,     GI/Hepatic/Renal/Endo      Musculoskeletal     Abdominal    Substance History      OB/GYN          Other   (+) arthritis                     Anesthesia Plan    ASA 2     general     intravenous induction   Anesthetic plan and risks discussed with patient.

## 2018-07-27 NOTE — ANESTHESIA POSTPROCEDURE EVALUATION
"Patient: Chante Harrell    Procedure Summary     Date:  07/27/18 Room / Location:  [facility] OR  / [facility] MAIN OR    Anesthesia Start:  0702 Anesthesia Stop:  0749    Procedures:       RECTAL FISTULOTOMY (N/A Rectum)      INCISION AND DRAINAGE OF PERIRECTAL ABSCESS (N/A Perirectal) Diagnosis:       Perirectal fistula      (Perirectal fistula [K60.4])    Surgeon:  Stacie Fitzgerald MD Provider:  Andrew Hurd MD    Anesthesia Type:  general ASA Status:  2          Anesthesia Type: general  Last vitals  BP   124/94 (07/27/18 0830)   Temp   36.8 °C (98.2 °F) (07/27/18 0820)   Pulse   95 (07/27/18 0830)   Resp   16 (07/27/18 0830)     SpO2   95 % (07/27/18 0830)     Post Anesthesia Care and Evaluation    Patient location during evaluation: bedside  Patient participation: complete - patient participated  Level of consciousness: awake and alert  Pain management: adequate  Airway patency: patent  Anesthetic complications: No anesthetic complications  PONV Status: none  Cardiovascular status: acceptable  Respiratory status: acceptable  Hydration status: acceptable    Comments: /94   Pulse 95   Temp 36.8 °C (98.2 °F) (Oral)   Resp 16   Ht 172.7 cm (68\")   Wt 87.2 kg (192 lb 4.8 oz)   SpO2 95%   BMI 29.24 kg/m²         "

## 2018-07-27 NOTE — ANESTHESIA PROCEDURE NOTES
Airway  Urgency: elective    Date/Time: 7/27/2018 7:10 AM  Airway not difficult    General Information and Staff    Patient location during procedure: OR  Anesthesiologist: ESDRAS ESCOBEDO  CRNA: RACHEL WALTON    Indications and Patient Condition  Indications for airway management: airway protection    Preoxygenated: yes  Mask difficulty assessment: 1 - vent by mask    Final Airway Details  Final airway type: endotracheal airway      Successful airway: ETT  Cuffed: yes   Successful intubation technique: direct laryngoscopy  Facilitating devices/methods: intubating stylet and cricoid pressure  Endotracheal tube insertion site: oral  Blade: Jo  Blade size: #3  ETT size: 7.0 mm  Cormack-Lehane Classification: grade I - full view of glottis  Placement verified by: chest auscultation and capnometry   Number of attempts at approach: 1    Additional Comments  Pre 02, sivi,, easy bvm, dlx1, grade 2A view, intubation x 1 attempt with Eschmann, +etc02, +bebs, appears atraumatic, teeth unchanged

## 2018-07-30 LAB
CYTO UR: NORMAL
LAB AP CASE REPORT: NORMAL
PATH REPORT.FINAL DX SPEC: NORMAL
PATH REPORT.GROSS SPEC: NORMAL

## 2018-08-07 NOTE — PROGRESS NOTES
"Chante Harrell is a 48 y.o. female in for follow up of Perirectal fistula  s/p incision and drainage chronic perirectal abscess, rectal fistulotomy 7/27/2018  s/p incision and drainage of perirectal abscess with multiple drain placement 6/18/2018     Pt states she has not needed any rx pain meds postop  She is taking aleve and ibuprofen, which are helpful  topical lidocaine also helpful    No difficulty with BMs  5-6 BMs per day  Formed-soft    Not much blood  +thick green drainage    No f/c  No difficulty urinating    /96 (BP Location: Left arm, Patient Position: Sitting, Cuff Size: Adult)   Pulse 79   Temp 98.3 °F (36.8 °C) (Oral)   Ht 172.7 cm (68\")   Wt 89 kg (196 lb 1.6 oz)   LMP  (LMP Unknown)   SpO2 95%   Breastfeeding? No   BMI 29.82 kg/m²   Body mass index is 29.82 kg/m².      PE:  Physical Exam   Constitutional: She is oriented to person, place, and time. She appears well-developed and well-nourished. No distress.   HENT:   Head: Normocephalic and atraumatic.   Eyes: Pupils are equal, round, and reactive to light.   Neck: No tracheal deviation present.   Pulmonary/Chest: Effort normal. No respiratory distress.   Abdominal: She exhibits no distension.   Genitourinary:   Genitourinary Comments: Perianal exam: external: anterior wound healing well with pink tissue.  +mucus drainage; no blood or purulence. Mild edema appropriate to stage of healing.  No fluctuance or induration.  No appearance infection. Snipped extruding sutures   Neurological: She is alert and oriented to person, place, and time.   Skin: Skin is warm and dry. She is not diaphoretic.   Psychiatric: She has a normal mood and affect. Her behavior is normal.   Vitals reviewed.        Assessment:   1. Perirectal fistula    s/p incision and drainage chronic perirectal abscess, rectal fistulotomy 7/27/2018  s/p incision and drainage of perirectal abscess with multiple drain placement 6/18/2018     Plan:    -path benign  -she is healing " well postop.  No special wound care necessary  -she can continue otc pain relievers and topical lidocaine  -discussed return precautions, including but not limited to: call or come in if any fever/chills, nausea/vomiting, new/worsening rectal pain or pressure, new/worsening anorectal bleeding or drainage (especially if malodorous), issues with bowel/bladder function, or any other concerning symptoms    RTC 2 weeks      Gina Spaulding PA-C  8/8/2018  10:37 AM

## 2018-08-08 ENCOUNTER — OFFICE VISIT (OUTPATIENT)
Dept: SURGERY | Facility: CLINIC | Age: 48
End: 2018-08-08

## 2018-08-08 VITALS
OXYGEN SATURATION: 95 % | WEIGHT: 196.1 LBS | SYSTOLIC BLOOD PRESSURE: 144 MMHG | HEIGHT: 68 IN | DIASTOLIC BLOOD PRESSURE: 96 MMHG | BODY MASS INDEX: 29.72 KG/M2 | HEART RATE: 79 BPM | TEMPERATURE: 98.3 F

## 2018-08-08 DIAGNOSIS — K60.4 PERIRECTAL FISTULA: Primary | ICD-10-CM

## 2018-08-08 PROCEDURE — 99024 POSTOP FOLLOW-UP VISIT: CPT | Performed by: PHYSICIAN ASSISTANT

## 2018-08-22 ENCOUNTER — OFFICE VISIT (OUTPATIENT)
Dept: SURGERY | Facility: CLINIC | Age: 48
End: 2018-08-22

## 2018-08-22 VITALS
HEIGHT: 68 IN | OXYGEN SATURATION: 98 % | WEIGHT: 197 LBS | DIASTOLIC BLOOD PRESSURE: 80 MMHG | SYSTOLIC BLOOD PRESSURE: 120 MMHG | RESPIRATION RATE: 18 BRPM | BODY MASS INDEX: 29.86 KG/M2 | HEART RATE: 74 BPM

## 2018-08-22 DIAGNOSIS — K60.4 PERIRECTAL FISTULA: Primary | ICD-10-CM

## 2018-08-22 DIAGNOSIS — K61.1 PERIRECTAL ABSCESS: ICD-10-CM

## 2018-08-22 PROCEDURE — 99024 POSTOP FOLLOW-UP VISIT: CPT | Performed by: COLON & RECTAL SURGERY

## 2018-08-22 NOTE — PROGRESS NOTES
"Chante Harrell is a 48 y.o. female in for follow up of Perirectal fistula    Perirectal abscess    S/p fistulotomy    Mucous and small amt blood  bm reg  Min pain    /80   Pulse 74   Resp 18   Ht 172.7 cm (68\")   Wt 89.4 kg (197 lb)   LMP  (LMP Unknown)   SpO2 98%   BMI 29.95 kg/m²   Body mass index is 29.95 kg/m².      PE:  Physical Exam   Constitutional: She appears well-developed. No distress.   HENT:   Head: Normocephalic and atraumatic.   Abdominal: Soft. She exhibits no distension.   Genitourinary:   Genitourinary Comments: Perianal exam - wound fisultomy healing   Musculoskeletal: Normal range of motion.   Neurological: She is alert.   Psychiatric: Thought content normal.         Assessment:   1. Perirectal fistula    2. Perirectal abscess         Plan:    Continue local wound care  rtc 3 wks          "

## 2018-09-19 ENCOUNTER — OFFICE VISIT (OUTPATIENT)
Dept: SURGERY | Facility: CLINIC | Age: 48
End: 2018-09-19

## 2018-09-19 VITALS
SYSTOLIC BLOOD PRESSURE: 120 MMHG | TEMPERATURE: 98.1 F | OXYGEN SATURATION: 98 % | RESPIRATION RATE: 18 BRPM | DIASTOLIC BLOOD PRESSURE: 70 MMHG | WEIGHT: 196 LBS | BODY MASS INDEX: 29.7 KG/M2 | HEART RATE: 98 BPM | HEIGHT: 68 IN

## 2018-09-19 DIAGNOSIS — K61.1 PERIRECTAL ABSCESS: ICD-10-CM

## 2018-09-19 DIAGNOSIS — K60.4 PERIRECTAL FISTULA: Primary | ICD-10-CM

## 2018-09-19 PROCEDURE — 99024 POSTOP FOLLOW-UP VISIT: CPT | Performed by: COLON & RECTAL SURGERY

## 2018-09-19 NOTE — PROGRESS NOTES
"Chante Harrell is a 48 y.o. female in for follow up of Perirectal fistula    Perirectal abscess  s/p incision and drainage chronic perirectal abscess, rectal fistulotomy 7/27/2018  s/p incision and drainage of perirectal abscess with multiple drain placement 6/18/2018       Pt states she is doing well    No blood  No drainage  No pain    No issues with BMs    /70   Pulse 98   Temp 98.1 °F (36.7 °C)   Resp 18   Ht 172.7 cm (68\")   Wt 88.9 kg (196 lb)   SpO2 98%   BMI 29.80 kg/m²   Body mass index is 29.8 kg/m².      PE:  Physical Exam   Constitutional: She appears well-developed. No distress.   HENT:   Head: Normocephalic and atraumatic.   Abdominal: Soft. She exhibits no distension.   Genitourinary:   Genitourinary Comments: Perianal exam: external: left anterior wound healing in well.  Small divot with pink granulation tissue; AgNO3 applied.  No blood, small amount mucus drainage.  No appearance infection   Musculoskeletal: Normal range of motion.   Neurological: She is alert.   Psychiatric: Thought content normal.         Assessment:   1. Perirectal fistula    2. Perirectal abscess     s/p incision and drainage chronic perirectal abscess, rectal fistulotomy 7/27/2018  s/p incision and drainage of perirectal abscess with multiple drain placement 6/18/2018     Plan:      -she is healing well postop.  AgNO3 applied.  No special wound care needed    Call or come in if any questions or concerns      RTC 2 weeks      Scribed for Stacie Fitzgerald MD by Gina Spaulding PA-C 9/19/2018  This patient was evaluated by me, recommendations made, documentation reviewed, edited, and revised by me, Stacie Fitzgerald MD        "

## 2018-10-02 ENCOUNTER — OFFICE VISIT (OUTPATIENT)
Dept: SURGERY | Facility: CLINIC | Age: 48
End: 2018-10-02

## 2018-10-02 VITALS
OXYGEN SATURATION: 98 % | HEART RATE: 69 BPM | SYSTOLIC BLOOD PRESSURE: 115 MMHG | WEIGHT: 196.9 LBS | BODY MASS INDEX: 29.84 KG/M2 | HEIGHT: 68 IN | RESPIRATION RATE: 18 BRPM | DIASTOLIC BLOOD PRESSURE: 80 MMHG | TEMPERATURE: 98.1 F

## 2018-10-02 DIAGNOSIS — K61.1 PERIRECTAL ABSCESS: ICD-10-CM

## 2018-10-02 DIAGNOSIS — L03.317 CELLULITIS OF LEFT BUTTOCK: Primary | ICD-10-CM

## 2018-10-02 DIAGNOSIS — K60.4 PERIRECTAL FISTULA: ICD-10-CM

## 2018-10-02 PROCEDURE — 99212 OFFICE O/P EST SF 10 MIN: CPT | Performed by: COLON & RECTAL SURGERY

## 2018-10-02 RX ORDER — SULFAMETHOXAZOLE AND TRIMETHOPRIM 800; 160 MG/1; MG/1
1 TABLET ORAL 2 TIMES DAILY
Qty: 14 TABLET | Refills: 0 | Status: SHIPPED | OUTPATIENT
Start: 2018-10-02 | End: 2018-10-09

## 2018-10-02 NOTE — PROGRESS NOTES
"Chante Harrell is a 48 y.o. female in for follow up s/p incision and drainage chronic perirectal abscess, rectal fistulotomy 7/27/2018  s/p incision and drainage of perirectal abscess with multiple drain placement 6/18/2018     Pt states that a new spot on her bottom hurts  Higher up on the buttock  Pain and warmth began yesterday    No f/c    /80   Pulse 69   Temp 98.1 °F (36.7 °C)   Resp 18   Ht 172.7 cm (68\")   Wt 89.3 kg (196 lb 14.4 oz)   SpO2 98%   BMI 29.94 kg/m²   Body mass index is 29.94 kg/m².      PE:  Physical Exam   Constitutional: She appears well-developed. No distress.   HENT:   Head: Normocephalic and atraumatic.   Abdominal: Soft. She exhibits no distension.   Genitourinary:   Genitourinary Comments: Perianal exam: external: L superomedial gluteal diffuse erythema and induration.  No drainage; no fluctuance  Anterior wound healing well with no evidence infection   Musculoskeletal: Normal range of motion.   Neurological: She is alert.   Psychiatric: Thought content normal.         Assessment:   1. Cellulitis of left buttock    2. Perirectal abscess    3. Perirectal fistula         Plan:    -rx bactrim DS bid x 7 days for L gluteal cellulitis  -recommended that patient use otc hibiclens to wash, and take otc probiotics  -call or come in if f/c, n/v, increased pain or pressure, any blood or drainage, or any other concerning symptoms    RTC 10 days      Scribed for Stacie Fitzgerald MD by Gina Spaulding PA-C 10/2/2018  This patient was evaluated by me, recommendations made, documentation reviewed, edited, and revised by me, Stacie Fitzgerald MD        "

## 2018-10-16 ENCOUNTER — OFFICE VISIT (OUTPATIENT)
Dept: SURGERY | Facility: CLINIC | Age: 48
End: 2018-10-16

## 2018-10-16 VITALS
HEIGHT: 68 IN | OXYGEN SATURATION: 98 % | WEIGHT: 199.3 LBS | DIASTOLIC BLOOD PRESSURE: 86 MMHG | TEMPERATURE: 97.8 F | HEART RATE: 75 BPM | BODY MASS INDEX: 30.2 KG/M2 | SYSTOLIC BLOOD PRESSURE: 148 MMHG

## 2018-10-16 DIAGNOSIS — K60.4 PERIRECTAL FISTULA: ICD-10-CM

## 2018-10-16 DIAGNOSIS — L03.317 CELLULITIS OF LEFT BUTTOCK: Primary | ICD-10-CM

## 2018-10-16 PROCEDURE — 99024 POSTOP FOLLOW-UP VISIT: CPT | Performed by: COLON & RECTAL SURGERY

## 2018-10-16 NOTE — PROGRESS NOTES
"Chante Harrell is a 48 y.o. female in for follow up of Cellulitis of left buttock    Perirectal fistula  s/p incision and drainage chronic perirectal abscess, rectal fistulotomy 7/27/2018  s/p incision and drainage of perirectal abscess with multiple drain placement 6/18/2018     Pt states she is having less buttock discomfort    No blood    No problem with BMs    /86 (BP Location: Left arm, Patient Position: Sitting, Cuff Size: Adult)   Pulse 75   Temp 97.8 °F (36.6 °C) (Oral)   Ht 172.7 cm (68\")   Wt 90.4 kg (199 lb 4.8 oz)   SpO2 98%   Breastfeeding? No   BMI 30.30 kg/m²   Body mass index is 30.3 kg/m².      PE:  Physical Exam   Constitutional: She appears well-developed. No distress.   HENT:   Head: Normocephalic and atraumatic.   Abdominal: Soft. She exhibits no distension.   Genitourinary:   Genitourinary Comments: Perianal exam: external: left anterior wound with thin flap of skin anterolaterally  Left gluteal cellulitis resolved   Musculoskeletal: Normal range of motion.   Neurological: She is alert.   Psychiatric: Thought content normal.         Assessment:   1. Cellulitis of left buttock    2. Perirectal fistula    s/p incision and drainage chronic perirectal abscess, rectal fistulotomy 7/27/2018  s/p incision and drainage of perirectal abscess with multiple drain placement 6/18/2018     Plan:    -opened wound in-office; pt tolerated well  -no special wound care needed  -L gluteal cellulitis resolved    Call or come in if any questions or concerning symptoms    RTC 2 weeks      Scribed for Stacie Fitzgerald MD by Gina Spaulding PA-C 10/16/2018  This patient was evaluated by me, recommendations made, documentation reviewed, edited, and revised by me, Stacie Fitzgerald MD        "

## 2018-10-21 ENCOUNTER — HOSPITAL ENCOUNTER (EMERGENCY)
Facility: HOSPITAL | Age: 48
Discharge: HOME | End: 2018-10-21
Attending: EMERGENCY MEDICINE | Admitting: EMERGENCY MEDICINE

## 2018-10-21 VITALS
BODY MASS INDEX: 29.25 KG/M2 | RESPIRATION RATE: 18 BRPM | WEIGHT: 193 LBS | HEART RATE: 111 BPM | OXYGEN SATURATION: 96 % | SYSTOLIC BLOOD PRESSURE: 139 MMHG | TEMPERATURE: 98.1 F | DIASTOLIC BLOOD PRESSURE: 89 MMHG | HEIGHT: 68 IN

## 2018-10-21 DIAGNOSIS — R04.0 LEFT-SIDED EPISTAXIS: Primary | ICD-10-CM

## 2018-10-21 PROCEDURE — 99283 EMERGENCY DEPT VISIT LOW MDM: CPT

## 2018-10-21 RX ADMIN — PHENYLEPHRINE HYDROCHLORIDE 2 SPRAY: 0.5 SPRAY NASAL at 12:33

## 2018-10-21 NOTE — DISCHARGE INSTRUCTIONS
Use 2 sprays of Keron-Synephrine every 6 hours over the next 3 days. Use a humidifier or Vaseline as directed.  
Orthopedic

## 2018-10-21 NOTE — ED PROVIDER NOTES
EMERGENCY DEPARTMENT ENCOUNTER    CHIEF COMPLAINT  Chief Complaint: epistaxis  History given by: patient  History limited by: nothing  Room Number: 3636  PMD: Nicolas Cool MD      HPI:  Pt is a 48 y.o. female who presents complaining of left sided epistaxis x4 today. Pt denies fever, rectal bleeding, CP or SOA. Pt states that she has had about 10 episodes of epistaxis over the last week that have resolved after placing pressure. Pt denies an injury to her nose or having a history of epistaxis prior to this week.    Duration/Onset/Timing: one week, gradual, intermittent  Location: left nare  Radiation: none  Quality: epistaxis  Intensity/Severity: moderate  Associated Symptoms: none  Aggravating or Alleviating Factors: improved with pressure  Previous Episodes: none      PAST MEDICAL HISTORY  Active Ambulatory Problems     Diagnosis Date Noted   • Perirectal abscess 2018   • Perirectal fistula 2018     Resolved Ambulatory Problems     Diagnosis Date Noted   • No Resolved Ambulatory Problems     Past Medical History:   Diagnosis Date   • Allergic rhinitis    • Anxiety    • Chronic fatigue    • Depression    • Goiter 2016   • Group B streptococcal infection during pregnancy    • Hypertension    • Migraines    • OA (osteoarthritis)    • Onychomycosis    • JESSE on CPAP    • Perirectal abscess 2018   • Rectal fistula 2018   • SAB (spontaneous ) 2001       PAST SURGICAL HISTORY  Past Surgical History:   Procedure Laterality Date   • COLONOSCOPY N/A 2010    WNL PER PT   • CYST REMOVAL N/A     ON ELBOW AND FACE   • FRACTURE SURGERY N/A     BROKEN COLLAR BONE   • INCISION AND DRAINAGE PERIRECTAL ABSCESS N/A 2018    Procedure: INCISION AND DRAINAGE OF PERIRECTAL ABSCESS;  Surgeon: Stacie Fitzgerald MD;  Location: [facility];  Service: General   • INTRAUTERINE DEVICE INSERTION N/A 2018    MIRENA, EXPIRES 2019, DR. MIGUELITO ZELAYA   • RECTAL FISTULOTOMY  N/A 6/18/2018    Procedure: Incision and Drainage of Perirectal Abscess with drain placement;  Surgeon: Stacie Fitzgerald MD;  Location: [facility] MAIN OR;  Service: General   • RECTAL FISTULOTOMY N/A 7/27/2018    Procedure: RECTAL FISTULOTOMY;  Surgeon: Stacie Fitzgerald MD;  Location: [facility] MAIN OR;  Service: General   • TONSILLECTOMY AND ADENOIDECTOMY Bilateral     DURING CHILDHOOD   • WISDOM TOOTH EXTRACTION Bilateral 1986       FAMILY HISTORY  Family History   Problem Relation Age of Onset   • No Known Problems Son    • Spina bifida Other    • Miscarriages / Stillbirths Other    • [place]'s disease Sister    • Diabetes Sister    • Hypertension Sister    • Thyroid disease Sister    • Heart disease Mother    • Hypertension Mother    • Skin cancer Mother    • Diabetes Father    • Heart disease Father    • Hypertension Father    • Kidney disease Father    • Prostate cancer Father    • Lung cancer Maternal Grandmother    • Stroke Paternal Grandmother    • Skin cancer Paternal Grandmother    • Diabetes Paternal Grandfather    • No Known Problems Son    • Malig Hyperthermia Neg Hx        SOCIAL HISTORY  Social History     Social History   • Marital status: Unknown     Spouse name: N/A   • Number of children: N/A   • Years of education: N/A     Occupational History   • Not on file.     Social History Main Topics   • Smoking status: Never Smoker   • Smokeless tobacco: Never Used   • Alcohol use 2.4 oz/week     4 Glasses of wine per week   • Drug use: No   • Sexual activity: Defer      Comment: PT HAS MIRENA     Other Topics Concern   • Not on file     Social History Narrative   • No narrative on file       ALLERGIES  Adhesive tape and Penicillins    REVIEW OF SYSTEMS  Review of Systems   Constitutional: Negative for fever.   HENT: Positive for nosebleeds (left sided).    Respiratory: Negative for shortness of breath.    Cardiovascular: Negative for chest pain.       PHYSICAL EXAM  ED Triage Vitals [10/21/18 1202]   Temp Heart  Rate Resp BP SpO2   -- 111 18 -- 96 %      Temp src Heart Rate Source Patient Position BP Location FiO2 (%)   -- Monitor -- -- --       Physical Exam   Constitutional: No distress.   HENT:   Head: Normocephalic and atraumatic.   Irritated nasal septum on the left   Cardiovascular: Regular rhythm.    Pulmonary/Chest: No respiratory distress.   Abdominal: There is no tenderness.   Musculoskeletal: She exhibits no tenderness.   Lymphadenopathy:     She has no cervical adenopathy.   Skin: No rash noted.   Nursing note and vitals reviewed.    PROCEDURES  Procedures      PROGRESS AND CONSULTS     1225- D/w pt that there are many different causes of epistaxis, including frequent nasal sprays. Discussed the plan to use keron-synephrine and a clamp to attempt to stop the bleeding. Notified pt that if she continues to have epistaxis after that time, I will place a nasal balloon. Pt understands and agrees with the plan, all questions answered.    1256- Rechecked pt. Pt is resting comfortably and states that she has had no re-occurrence of her epistaxis in the ED. Discussed the plan to discharge the pt home with a referral to ENT. I instructed the pt to use Keron-Synephrine every 6 hours for the next 3 days. Pt understands and agrees with the plan, all questions answered.      MEDICAL DECISION MAKING  Results were reviewed/discussed with the patient and they were also made aware of online access. Pt also made aware that follow up with PMD is necessary.     MDM       DIAGNOSIS  Final diagnoses:   Left-sided epistaxis       DISPOSITION  DISCHARGE    Patient discharged in stable condition.    Reviewed implications of results, diagnosis, meds, responsibility to follow up, warning signs and symptoms of possible worsening, potential complications and reasons to return to ER.    Patient/Family voiced understanding of above instructions.    Discussed plan for discharge, as there is no emergent indication for admission. Patient referred to  primary care provider for BP management due to today's BP. Pt/family is agreeable and understands need for follow up and repeat testing.  Pt is aware that discharge does not mean that nothing is wrong but it indicates no emergency is present that requires admission and they must continue care with follow-up as given below or physician of their choice.     FOLLOW-UP  Nicolas Cool MD  [address]  [address]  [facility] [address]  [phone]      As needed    Alfredo Ness MD  [address]  [address]  [facility] [address]  [phone]    Call   As needed, If symptoms worsen         Medication List      Stop    ALEVE 220 MG capsule  Generic drug:  Naproxen Sodium     meloxicam 15 MG tablet  Commonly known as:  MOBIC              Latest Documented Vital Signs:  As of 1:01 PM  BP- 143/94 HR- 111 Temp- 98.1 °F (36.7 °C) (Oral) O2 sat- 96%    --  Documentation assistance provided by didi Rodriguez for Dr. Giang.  Information recorded by the scribe was done at my direction and has been verified and validated by me.     Meaghan Rodriguez  10/21/18 1237       Meaghan Rodriguez  10/21/18 1301       Manav Giang MD  10/21/18 1301

## 2018-10-30 ENCOUNTER — OFFICE VISIT (OUTPATIENT)
Dept: SURGERY | Facility: CLINIC | Age: 48
End: 2018-10-30

## 2018-10-30 VITALS
OXYGEN SATURATION: 99 % | HEIGHT: 68 IN | SYSTOLIC BLOOD PRESSURE: 150 MMHG | WEIGHT: 200.1 LBS | HEART RATE: 98 BPM | DIASTOLIC BLOOD PRESSURE: 84 MMHG | BODY MASS INDEX: 30.33 KG/M2 | TEMPERATURE: 98.3 F

## 2018-10-30 DIAGNOSIS — K60.4 PERIRECTAL FISTULA: Primary | ICD-10-CM

## 2018-10-30 DIAGNOSIS — Z12.11 SCREEN FOR COLON CANCER: ICD-10-CM

## 2018-10-30 PROCEDURE — 99024 POSTOP FOLLOW-UP VISIT: CPT | Performed by: COLON & RECTAL SURGERY

## 2018-10-30 RX ORDER — SODIUM CHLORIDE, SODIUM LACTATE, POTASSIUM CHLORIDE, CALCIUM CHLORIDE 600; 310; 30; 20 MG/100ML; MG/100ML; MG/100ML; MG/100ML
30 INJECTION, SOLUTION INTRAVENOUS CONTINUOUS
Status: CANCELLED | OUTPATIENT
Start: 2018-10-30

## 2018-12-27 ENCOUNTER — TRANSCRIBE ORDERS (OUTPATIENT)
Dept: ADMINISTRATIVE | Facility: HOSPITAL | Age: 48
End: 2018-12-27

## 2018-12-27 DIAGNOSIS — R10.11 RUQ PAIN: Primary | ICD-10-CM

## 2019-01-03 ENCOUNTER — HOSPITAL ENCOUNTER (OUTPATIENT)
Dept: ULTRASOUND IMAGING | Facility: HOSPITAL | Age: 49
Discharge: HOME | End: 2019-01-03
Admitting: FAMILY MEDICINE

## 2019-01-03 DIAGNOSIS — R10.11 RUQ PAIN: ICD-10-CM

## 2019-01-03 PROCEDURE — 76705 ECHO EXAM OF ABDOMEN: CPT

## 2019-05-02 ENCOUNTER — APPOINTMENT (OUTPATIENT)
Dept: WOMENS IMAGING | Facility: HOSPITAL | Age: 49
End: 2019-05-02

## 2019-05-02 PROCEDURE — 77067 SCR MAMMO BI INCL CAD: CPT | Performed by: RADIOLOGY

## 2019-05-02 PROCEDURE — 77063 BREAST TOMOSYNTHESIS BI: CPT | Performed by: RADIOLOGY

## 2024-06-10 ENCOUNTER — OFFICE VISIT (OUTPATIENT)
Dept: OBSTETRICS AND GYNECOLOGY | Facility: CLINIC | Age: 54
End: 2024-06-10
Payer: COMMERCIAL

## 2024-06-10 VITALS
HEIGHT: 68 IN | DIASTOLIC BLOOD PRESSURE: 81 MMHG | WEIGHT: 213 LBS | SYSTOLIC BLOOD PRESSURE: 127 MMHG | BODY MASS INDEX: 32.28 KG/M2

## 2024-06-10 DIAGNOSIS — Z30.432 ENCOUNTER FOR IUD REMOVAL: ICD-10-CM

## 2024-06-10 DIAGNOSIS — Z01.419 VISIT FOR GYNECOLOGIC EXAMINATION: Primary | ICD-10-CM

## 2024-06-10 PROCEDURE — 58301 REMOVE INTRAUTERINE DEVICE: CPT | Performed by: OBSTETRICS & GYNECOLOGY

## 2024-06-10 PROCEDURE — 99459 PELVIC EXAMINATION: CPT | Performed by: OBSTETRICS & GYNECOLOGY

## 2024-06-10 PROCEDURE — 99386 PREV VISIT NEW AGE 40-64: CPT | Performed by: OBSTETRICS & GYNECOLOGY

## 2024-06-10 RX ORDER — METOPROLOL SUCCINATE 50 MG/1
50 TABLET, EXTENDED RELEASE ORAL DAILY
COMMUNITY

## 2024-06-10 RX ORDER — LISINOPRIL AND HYDROCHLOROTHIAZIDE 20; 12.5 MG/1; MG/1
TABLET ORAL
COMMUNITY

## 2024-06-10 RX ORDER — AMLODIPINE BESYLATE 5 MG/1
1 TABLET ORAL DAILY
COMMUNITY
Start: 2024-05-07

## 2024-06-10 NOTE — PROGRESS NOTES
[facility]  [address], [address]  [place], [place] [address]  Phone: [phone] / Fax:  [phone]      06/10/2024    [address] [place] [id number]    Markert, Nicolas SUNG MD    Chief Complaint   Patient presents with    Gynecologic Exam     NP Annual Exam,last pap 18-NL.. Mammogram , Colonoscopy 2010-WNL per patient. Cologuard 20-Negative. Patient with Mirena IUD in place since 18. She does c/o hot flashes and night sweats.       Chante Harrell is here for annual gynecologic exam.  HPI - Patient with last pap over 6 years ago and was reported as normal.  Last mammogram was in 2018 and reported as normal.  Patient had a negative Cologuard nearly 4 years ago and is in process of setting up colonoscopy with PCP.  She has a Mirena IUD in place since .  She has no bleeding.  She reports hot flashes/night sweats.    Past Medical History:   Diagnosis Date    Allergic rhinitis     Anxiety     TX'D WITH LEXAPRO    Chronic fatigue     Depression     Goiter 2016    Group B streptococcal infection during pregnancy     Hypertension     Migraines     OA (osteoarthritis)     Onychomycosis     JESSE on CPAP     Perirectal abscess 2018    cont to drain and will have 2nd surgery 18    Rectal fistula 2018    continues to drain and will now have 2nd surgery for it 18    SAB (spontaneous ) 2001     A1, MISSED  AT 5 WEEKS       Past Surgical History:   Procedure Laterality Date    COLONOSCOPY N/A 2010    WNL PER PT    CYST REMOVAL N/A     ON ELBOW AND FACE    FRACTURE SURGERY N/A     BROKEN COLLAR BONE    INCISION AND DRAINAGE PERIRECTAL ABSCESS N/A 2018    Procedure: INCISION AND DRAINAGE OF PERIRECTAL ABSCESS;  Surgeon: Stacie Fitzgerald MD;  Location: [facility] OR;  Service: General    INTRAUTERINE DEVICE INSERTION N/A 2018    MIRENA, EXPIRES 2019, DR. MIGUELITO ZELAYA    RECTAL FISTULOTOMY N/A 2018     Procedure: Incision and Drainage of Perirectal Abscess with drain placement;  Surgeon: Stacie Fitzgerald MD;  Location: [facility] OR;  Service: General    RECTAL FISTULOTOMY N/A 2018    Procedure: RECTAL FISTULOTOMY;  Surgeon: Stacie Fitzgerald MD;  Location: [facility];  Service: General    TONSILLECTOMY AND ADENOIDECTOMY Bilateral     DURING CHILDHOOD    WISDOM TOOTH EXTRACTION Bilateral 1986       Allergies   Allergen Reactions    Penicillins Anaphylaxis     AS A KID    Adhesive Tape Rash     Skin irritation and blisters    Sulfa Antibiotics Rash       Social History     Socioeconomic History    Marital status: Single   Tobacco Use    Smoking status: Never    Smokeless tobacco: Never   Vaping Use    Vaping status: Never Used   Substance and Sexual Activity    Alcohol use: Yes     Alcohol/week: 4.0 standard drinks of alcohol     Types: 4 Glasses of wine per week    Drug use: No    Sexual activity: Not Currently     Birth control/protection: I.U.D.     Comment: Mirena 18       Family History   Problem Relation Age of Onset    Diabetes Father     Heart disease Father     Hypertension Father     Kidney disease Father     Prostate cancer Father     Heart disease Mother     Hypertension Mother     Skin cancer Mother     Prudencio's disease Sister     Diabetes Sister     Hypertension Sister     Thyroid disease Sister     No Known Problems Son     No Known Problems Son     Diabetes Paternal Grandfather     Stroke Paternal Grandmother     Skin cancer Paternal Grandmother     Lung cancer Maternal Grandmother     Spina bifida Other     Miscarriages / Stillbirths Other     Malig Hyperthermia Neg Hx     Breast cancer Neg Hx     Colon cancer Neg Hx        No LMP recorded. Patient has had an implant.    OB History          3    Para        Term                AB   1    Living             SAB   1    IAB        Ectopic        Molar        Multiple        Live Births   2                Vitals:    06/10/24  "1240   BP: 127/81   Weight: 96.6 kg (213 lb)   Height: 171.5 cm (67.5\")       Physical Exam  Constitutional:       Appearance: Normal appearance. She is well-developed.   Genitourinary:      Bladder and urethral meatus normal.      Right Labia: No tenderness or lesions.     Left Labia: No tenderness or lesions.     No vaginal discharge or tenderness.        Right Adnexa: not tender and not full.     Left Adnexa: not tender and not full.     No cervical motion tenderness or lesion.      IUD strings visualized.      Uterus is not enlarged or tender.      No urethral tenderness or hypermobility present.   Breasts:     Right: No mass or nipple discharge.      Left: No mass or nipple discharge.   HENT:      Right Ear: External ear normal.      Left Ear: External ear normal.      Nose: Nose normal.   Eyes:      Conjunctiva/sclera: Conjunctivae normal.   Neck:      Thyroid: No thyromegaly.   Cardiovascular:      Rate and Rhythm: Normal rate and regular rhythm.      Heart sounds: Normal heart sounds.   Pulmonary:      Effort: Pulmonary effort is normal.      Breath sounds: No stridor. No wheezing.   Abdominal:      Palpations: Abdomen is soft.      Tenderness: There is no abdominal tenderness. There is no guarding or rebound.   Musculoskeletal:         General: Normal range of motion.      Cervical back: Normal range of motion and neck supple.   Neurological:      Mental Status: She is alert.      Coordination: Coordination normal.   Skin:     General: Skin is warm and dry.   Psychiatric:         Mood and Affect: Mood normal.         Behavior: Behavior normal.         Thought Content: Thought content normal.         Judgment: Judgment normal.   Vitals reviewed. Exam conducted with a chaperone present.     IUD Removal Procedure Note    Type of IUD:  Mirena  Date of insertion:  April 17, 2018  Reason for removal:  Device expiration  Other relevant history/information:  none    Procedure Time Out Documentation      Procedure " Details  IUD strings visible:  yes  Local anesthesia:  None  Tenaculum used:  None  Removal:  IUD strings grasped and IUD removed intact with gentle traction.  The patient tolerated the procedure well.    All appropriate instructions regarding removal were reviewed.    Patient tolerated the procedure well without complications.    Plans for contraception:  no method    Other follow-up needed:  none    . Diagnoses and all orders for this visit:    1. Visit for gynecologic examination (Primary)  -     IgP, Aptima HPV  -     Discussed importance of regular screening and breast awareness.    2. Encounter for IUD removal        -     The patient was advised to call for any fever or for prolonged or severe pain or bleeding. She was advised to use NSAID as needed for mild to moderate pain      Simon Pearson MD

## 2024-06-11 ENCOUNTER — PROCEDURE VISIT (OUTPATIENT)
Dept: OBSTETRICS AND GYNECOLOGY | Facility: CLINIC | Age: 54
End: 2024-06-11
Payer: COMMERCIAL

## 2024-06-11 DIAGNOSIS — Z12.31 VISIT FOR SCREENING MAMMOGRAM: Primary | ICD-10-CM

## 2024-06-11 PROCEDURE — 77063 BREAST TOMOSYNTHESIS BI: CPT | Performed by: OBSTETRICS & GYNECOLOGY

## 2024-06-11 PROCEDURE — 77067 SCR MAMMO BI INCL CAD: CPT | Performed by: OBSTETRICS & GYNECOLOGY

## 2024-06-12 LAB
CYTOLOGIST CVX/VAG CYTO: ABNORMAL
CYTOLOGY CVX/VAG DOC CYTO: ABNORMAL
CYTOLOGY CVX/VAG DOC THIN PREP: ABNORMAL
DX ICD CODE: ABNORMAL
HPV I/H RISK 4 DNA CVX QL PROBE+SIG AMP: POSITIVE
Lab: ABNORMAL
OTHER STN SPEC: ABNORMAL
STAT OF ADQ CVX/VAG CYTO-IMP: ABNORMAL

## 2024-06-13 ENCOUNTER — TELEPHONE (OUTPATIENT)
Dept: OBSTETRICS AND GYNECOLOGY | Facility: CLINIC | Age: 54
End: 2024-06-13
Payer: COMMERCIAL

## 2024-06-13 DIAGNOSIS — N64.89 BREAST ASYMMETRY: ICD-10-CM

## 2024-06-13 DIAGNOSIS — R92.8 ABNORMAL MAMMOGRAM: Primary | ICD-10-CM

## 2024-06-13 NOTE — TELEPHONE ENCOUNTER
I spoke to the patient, she is aware of her pap being positive for HPV and is scheduled for a Colposcopy on July 8th. 6-13-24/lw

## 2024-06-14 NOTE — TELEPHONE ENCOUNTER
Pt worked with [facility] and scheduled her DX Left Mammo & Left Limited US for 6/26/24 @ 730 & 8am.

## 2024-06-18 ENCOUNTER — PATIENT ROUNDING (BHMG ONLY) (OUTPATIENT)
Dept: OBSTETRICS AND GYNECOLOGY | Facility: CLINIC | Age: 54
End: 2024-06-18
Payer: COMMERCIAL

## 2024-06-18 ENCOUNTER — PATIENT MESSAGE (OUTPATIENT)
Dept: OBSTETRICS AND GYNECOLOGY | Facility: CLINIC | Age: 54
End: 2024-06-18
Payer: COMMERCIAL

## 2024-06-26 ENCOUNTER — APPOINTMENT (OUTPATIENT)
Dept: WOMENS IMAGING | Facility: HOSPITAL | Age: 54
End: 2024-06-26
Payer: COMMERCIAL

## 2024-06-26 PROCEDURE — G0279 TOMOSYNTHESIS, MAMMO: HCPCS | Performed by: RADIOLOGY

## 2024-06-26 PROCEDURE — 76642 ULTRASOUND BREAST LIMITED: CPT | Performed by: RADIOLOGY

## 2024-06-26 PROCEDURE — 77061 BREAST TOMOSYNTHESIS UNI: CPT | Performed by: RADIOLOGY

## 2024-06-26 PROCEDURE — 77065 DX MAMMO INCL CAD UNI: CPT | Performed by: RADIOLOGY

## 2024-06-27 DIAGNOSIS — R92.8 ABNORMAL MAMMOGRAM: ICD-10-CM

## 2024-06-27 DIAGNOSIS — N64.89 BREAST ASYMMETRY: ICD-10-CM

## 2024-07-01 ENCOUNTER — TELEPHONE (OUTPATIENT)
Dept: OBSTETRICS AND GYNECOLOGY | Facility: CLINIC | Age: 54
End: 2024-07-01
Payer: COMMERCIAL

## 2024-07-01 DIAGNOSIS — N63.20 MASS OF LEFT BREAST, UNSPECIFIED QUADRANT: Primary | ICD-10-CM

## 2024-07-01 NOTE — TELEPHONE ENCOUNTER
7/1/2024  SW pt and she was aware of the need for L breast biopsy. Order placed for L US giuded breast biopsy and I made appt for pt at [facility] on 7/17/24 1:00pm, she is aware

## 2024-07-08 ENCOUNTER — PROCEDURE VISIT (OUTPATIENT)
Dept: OBSTETRICS AND GYNECOLOGY | Facility: CLINIC | Age: 54
End: 2024-07-08
Payer: COMMERCIAL

## 2024-07-08 VITALS
WEIGHT: 210 LBS | BODY MASS INDEX: 31.83 KG/M2 | DIASTOLIC BLOOD PRESSURE: 76 MMHG | HEIGHT: 68 IN | SYSTOLIC BLOOD PRESSURE: 116 MMHG

## 2024-07-08 DIAGNOSIS — R87.89 HIGH RISK HUMAN PAPILLOMAVIRUS (HPV) DNA TEST POSITIVE: Primary | ICD-10-CM

## 2024-07-08 NOTE — PROGRESS NOTES
Colposcopy Procedure Note    Indications: Pap smear 1 months ago showed: PAP negative with + High risk HPV . The prior pap showed no abnormalities.  Prior cervical/vaginal disease: none. Prior cervical treatment: no treatment.    Procedure Details   The risks and benefits of the procedure and Verbal informed consent obtained.    Speculum placed in vagina and excellent visualization of cervix achieved, cervix swabbed x 3 with acetic acid solution.    Findings:  Cervix: no visible lesions; no biopsies taken and endocervical curettage performed.  Vaginal inspection: normal without visible lesions.  Vulvar colposcopy: vulvar colposcopy not performed.    Specimens: endocervical curettage    Complications: none.  Patient tolerated the procedure well without complications.    Plan:  Specimens labelled and sent to Pathology.  Will base further treatment on Pathology findings.  Likely follow up in one year.    Simon Pearson MD

## 2024-07-12 ENCOUNTER — TELEPHONE (OUTPATIENT)
Dept: OBSTETRICS AND GYNECOLOGY | Facility: CLINIC | Age: 54
End: 2024-07-12
Payer: COMMERCIAL

## 2024-07-12 LAB
PATH REPORT.FINAL DX SPEC: NORMAL
PATH REPORT.GROSS SPEC: NORMAL
PATH REPORT.SITE OF ORIGIN SPEC: NORMAL
PATHOLOGIST NAME: NORMAL
PAYMENT PROCEDURE: NORMAL

## 2024-07-12 NOTE — TELEPHONE ENCOUNTER
Patient is aware that her follow up testing to her pap was normal.  She should follow up in one year.7-11-24/lw      ----- Message from Simon Pearson sent at 7/12/2024 12:52 PM EDT -----  LAW - Let her know that her follow up testing to her pap was normal.  She should follow up in one year.

## 2024-07-17 ENCOUNTER — APPOINTMENT (OUTPATIENT)
Dept: WOMENS IMAGING | Facility: HOSPITAL | Age: 54
End: 2024-07-17
Payer: COMMERCIAL

## 2024-07-17 ENCOUNTER — LAB REQUISITION (OUTPATIENT)
Dept: LAB | Facility: HOSPITAL | Age: 54
End: 2024-07-17
Payer: COMMERCIAL

## 2024-07-17 DIAGNOSIS — N63.0 UNSPECIFIED LUMP IN UNSPECIFIED BREAST: ICD-10-CM

## 2024-07-17 PROCEDURE — 88360 TUMOR IMMUNOHISTOCHEM/MANUAL: CPT | Performed by: OBSTETRICS & GYNECOLOGY

## 2024-07-17 PROCEDURE — 19083 BX BREAST 1ST LESION US IMAG: CPT | Performed by: RADIOLOGY

## 2024-07-17 PROCEDURE — 88341 IMHCHEM/IMCYTCHM EA ADD ANTB: CPT | Performed by: OBSTETRICS & GYNECOLOGY

## 2024-07-17 PROCEDURE — A4648 IMPLANTABLE TISSUE MARKER: HCPCS | Performed by: RADIOLOGY

## 2024-07-17 PROCEDURE — 88305 TISSUE EXAM BY PATHOLOGIST: CPT | Performed by: OBSTETRICS & GYNECOLOGY

## 2024-07-17 PROCEDURE — 88342 IMHCHEM/IMCYTCHM 1ST ANTB: CPT | Performed by: OBSTETRICS & GYNECOLOGY

## 2024-07-23 ENCOUNTER — TELEPHONE (OUTPATIENT)
Dept: OBSTETRICS AND GYNECOLOGY | Facility: CLINIC | Age: 54
End: 2024-07-23
Payer: COMMERCIAL

## 2024-07-23 DIAGNOSIS — C50.919 MALIGNANT NEOPLASM OF FEMALE BREAST, UNSPECIFIED ESTROGEN RECEPTOR STATUS, UNSPECIFIED LATERALITY, UNSPECIFIED SITE OF BREAST: Primary | ICD-10-CM

## 2024-07-23 LAB
DX PRELIMINARY: NORMAL
LAB AP CASE REPORT: NORMAL
LAB AP SPECIAL STAINS: NORMAL
LAB AP SYNOPTIC CHECKLIST: NORMAL
PATH REPORT.FINAL DX SPEC: NORMAL
PATH REPORT.GROSS SPEC: NORMAL

## 2024-07-23 NOTE — TELEPHONE ENCOUNTER
Patient diagnosed with breast cancer and has follow up next week with Dr Rea.  Questions answered.

## 2024-07-24 ENCOUNTER — PATIENT OUTREACH (OUTPATIENT)
Dept: OTHER | Facility: HOSPITAL | Age: 54
End: 2024-07-24
Payer: COMMERCIAL

## 2024-07-24 ENCOUNTER — TELEPHONE (OUTPATIENT)
Dept: SURGERY | Facility: CLINIC | Age: 54
End: 2024-07-24
Payer: COMMERCIAL

## 2024-07-24 DIAGNOSIS — N63.20 MASS OF LEFT BREAST, UNSPECIFIED QUADRANT: ICD-10-CM

## 2024-07-24 NOTE — PROGRESS NOTES
Introductory call placed to Ms. Harrell. Introduced myself and navigation services. She stated she is aware of appointment time and place. She will bring her sister with her to the consult. She has no immediate needs at this time. Will meet her at her surgery appointment July 29th.

## 2024-07-25 DIAGNOSIS — C50.919 MALIGNANT NEOPLASM OF FEMALE BREAST, UNSPECIFIED ESTROGEN RECEPTOR STATUS, UNSPECIFIED LATERALITY, UNSPECIFIED SITE OF BREAST: Primary | ICD-10-CM

## 2024-07-29 ENCOUNTER — OFFICE VISIT (OUTPATIENT)
Dept: SURGERY | Facility: CLINIC | Age: 54
End: 2024-07-29
Payer: COMMERCIAL

## 2024-07-29 ENCOUNTER — PATIENT OUTREACH (OUTPATIENT)
Dept: OTHER | Facility: HOSPITAL | Age: 54
End: 2024-07-29
Payer: COMMERCIAL

## 2024-07-29 VITALS
HEIGHT: 68 IN | DIASTOLIC BLOOD PRESSURE: 96 MMHG | WEIGHT: 217.4 LBS | BODY MASS INDEX: 32.95 KG/M2 | SYSTOLIC BLOOD PRESSURE: 140 MMHG

## 2024-07-29 DIAGNOSIS — C50.919 MALIGNANT NEOPLASM OF FEMALE BREAST, UNSPECIFIED ESTROGEN RECEPTOR STATUS, UNSPECIFIED LATERALITY, UNSPECIFIED SITE OF BREAST: Primary | ICD-10-CM

## 2024-07-29 NOTE — PROGRESS NOTES
Referral received from Dr. Rea's office. Met Ms. Harrell and her sister during her surgery consult. I introduced myself and navigational services.    She is a new diagnosis of Left Breast Invasive Ductal Carcinoma ER/NH+ Her2-, Ki-67 13%.      She stated she has a wonderful support system with her sister and roommate. She stated she feels comfortable talking to them about needs or issues.      She stated she has no financial or transportation concerns at this time. She has no resource needs or ongoing concerns at this time.      She stated she is doing well emotionally but has had her up and downs. We discussed we have support options if the need arises. She was thankful for the information.      We discussed integrative therapies and other services at the [facility]. She received a navigation folder with the following information:     [facility], Cancer and Restorative Exercise (CARE), [employer] Exercise program, Guide for the Newly Diagnosed, [employer], [facility], Massage Therapy, Reiki Therapy, [employer] [place], Cancer Nutrition, and Survivorship Clinic.     She verbalized appreciation for navigational services and she has my contact information and will call with any questions that arise.

## 2024-07-29 NOTE — PROGRESS NOTES
General Surgery Breast Cancer History and Physical Exam     Summary:    Chante Harrell is a 54 y.o. lady who presents with a new diagnosis of left breast invasive ductal carcinoma: Grade I, Ki-67 13%, ER+/MO+, Her2-; rC2P1W6, Stage I.      A multidisciplinary plan has been formulated for the patient:    (1) Breast Surgical Oncology:  -No indication for preoperative [employer] 9 panel genetic testing.   -MRI to evaluate anatomy as well as for surgical planning. Scheduled 8/15/2024.   -Nurse navigator consult.   -Surgical plan: She believes she would like to proceed with left breast TAMIKA guided lumpectomy with sentinel lymph node biopsy.  -Plastic surgery referral client.    (2) Medical Oncology:  -Will refer postoperatively for evaluation for endocrine therapy and possible need for chemotherapy.    (3) Radiation Oncology:  -Will refer postoperatively for evaluation for radiation therapy.    (4) screening for colon cancer:  -Due for colonoscopy.  Will address postoperatively.    Referring Provider: No ref. provider found    Chief Complaint: abnormal breast imaging    History of Present Illness: Ms. Chante Harrell is a 54 y.o. year old lady, seen at the request of No ref. provider found for a new diagnosis of left breast cancer.      This was initially detected as an imaging abnormality.  This was her first mammogram since before COVID.  She had no prior breast biopsy.  Her work-up is detailed in the oncologic history below.     She denies any breast lumps, pain, skin changes, or nipple discharge. She denies any family history of breast or ovarian cancer.     Workup of Current Diagnosis:    6/11/2024 bilateral Screening Mammogram:  There are scattered areas of fibroglandular density.  There is an isodense asymmetry measuring 4 mm in the middle one third medial region of the left breast.  Requires additional evaluation.  BI-RADS Category 0    6/26/2024 left breast diagnostic Mammogram with Ultrasound:   There are scattered  areas of fibroglandular density.  On present exam there is an isodense asymmetry measuring 4 mm in the middle one third region of the left breast at 6:00 5 cm from the nipple.  Ultrasound demonstrates a round hypoechoic mass with partially defined margins measuring 4 mm in the middle one third region of the left breast at 6:00 5 cm from the nipple.  Suspicious.  Ultrasound-guided biopsy is recommended.  BI-RADS Category 4B    7/17/2024 left breast ultrasound-guided biopsy:   Left breast at 6:00 was imaged and the mass was localized.  13 cores were obtained.  A mini cork tissue marker was placed.  Clip was in the expected position.  Pathology returned as invasive ductal carcinoma and DCIS.    7/17/2024 Pathology:   Final Diagnosis   1. Left breast, 6:00, ultrasound-guided core needle biopsy (mini cork clip):               A. Invasive ductal carcinoma, well-differentiated; Nick histologic grade I/III        (tubule score = 2, nuclear score = 2, mitoses score = 1), measuring at least 3 mm.                B. 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               C. Negative for lymphovascular space invasion.   D. See biomarker template.     Gynecologic History:   G:3. P:2 AB:1  Age at first childbirth: 32  Lactation/How long: none  Age at menarche: 13  Age at menopause: unsure  Total years of oral contraceptive use: 26 years previously  Total years of hormone replacement therapy: none    Past Medical History:   HTN  OA    Past Surgical History:    [place] tooth extraction   Tonsillectomy/adenoidectomy  I&D perirectal abscess, Rectal fistulotomy  IUD    Family History:    As above    Social History:  Denies tobacco use  Occasional alcohol use    Allergies:   Allergies   Allergen Reactions    Penicillins Anaphylaxis and Other (See Comments)     AS A KID    Adhesive Tape Rash     Skin irritation and blisters    Sulfa Antibiotics Rash and Other (See  Comments)     Medications:     Current Outpatient Medications:     amLODIPine (NORVASC) 5 MG tablet, Take 1 tablet by mouth Daily., Disp: , Rfl:     ibuprofen (ADVIL,MOTRIN) 200 MG tablet, Take 1 tablet by mouth Every 6 (Six) Hours As Needed for Mild Pain., Disp: , Rfl:     lisinopril-hydrochlorothiazide (PRINZIDE,ZESTORETIC) 20-12.5 MG per tablet, take 1 tablet by mouth every day for 30 days, Disp: , Rfl:     metoprolol succinate XL (TOPROL-XL) 50 MG 24 hr tablet, Take 1 tablet by mouth Daily., Disp: , Rfl:     PARoxetine (PAXIL) 20 MG tablet, Take 1 tablet by mouth Every Morning., Disp: , Rfl:     Laboratory Values:    Labs from 6/10/2024 reviewed by me     Review of Systems:   Influenza-like illness: no fever, no  cough, no  sore throat, no  body aches, no loss of sense of taste or smell, no known exposure to person with Covid-19.  Constitutional: Negative for fevers or chills  HENT: Negative for hearing loss or runny nose  Eyes: Negative for vision changes or scleral icterus  Respiratory: Negative for cough or shortness of breath  Cardiovascular: Negative for chest pain or heart palpitations  Gastrointestinal: Negative for abdominal pain, nausea, vomiting, constipation, melena, or hematochezia  Genitourinary: Negative for hematuria or dysuria  Musculoskeletal: Negative for joint swelling or gait instability  Neurologic: Negative for tremors or seizures  Psychiatric: Negative for suicidal ideations or depression  All other systems reviewed and negative    Physical Exam:   ECO - Asymptomatic  Constitutional: Well-developed well-nourished, no acute distress  Eyes: Conjunctiva normal, sclera nonicteric  ENMT: Hearing grossly normal, oral mucosa moist  Neck: Supple, no palpable mass, trachea midline  Respiratory: Clear to auscultation, normal inspiratory effort  Cardiovascular: Regular rate, no peripheral edema, no jugular venous distention  Breast: symmetric  Right: No visible abnormalities on inspection while  seated, with arms raised or hands on hips. No masses, skin changes, or nipple abnormalities.  Left: No visible abnormalities on inspection while seated, with arms raised or hands on hips. No masses, skin changes, or nipple abnormalities.  Biopsy site appreciated in the left breast, otherwise no skin changes.   No clinical chest wall involvement.  Gastrointestinal: Soft, nontender  Lymphatics (palpable nodes): No cervical, supraclavicular or axillary lymphadenopathy  Skin:  Warm, dry, no rash on visualized skin surfaces  Musculoskeletal: Symmetric strength, normal gait  Psychiatric: Alert and oriented ×3, normal affect     BMI is >= 30 and <35. (Class 1 Obesity). The following options were offered after discussion;: weight loss educational material (shared in after visit summary)    Discussion:  I had an extensive discussion with the patient and her family about the nature of her breast cancer diagnosis. We reviewed the components of breast tissue including ducts and lobules. We reviewed her pathology report in detail. We reviewed breast cancer histology, including stage, grade, ER/VA receptors, HER2 receptors and how this applies to her diagnosis. We reviewed the basics of systemic and local/regional management of breast cancer.     We discussed that most breast cancer is not hereditary, that I do not believe this plays a role in her case, and that genetic testing is not warranted.     We reviewed potential surgical treatments to include partial mastectomy, mastectomy, sentinel lymph node biopsy and axillary node dissection and discussed the rationale associated with each approach. Regarding radiation therapy, we discussed that radiation is indicated in all cases of breast conservation and in only limited circumstances following mastectomy. We discussed that the primary goal of adjuvant radiation is to decrease the likelihood of local recurrence.     We discussed axillary staging. I described the procedure for  sentinel lymph node biopsy in detail, including the preoperative injection of technetium sulfur colloid and intraoperative injection of lymphazurin blue dye. I explained that this is a mapping test and not a cancer test, that all of the lymph nodes containing these dyes will be removed for complete testing by pathology, and that the results could impact the decision for adjuvant treatment or additional surgery.    I described additional risks and potential complications associated with surgery, including, but not limited to, bleeding, infection, complications related to blue dye, lymphedema, deformity/poor cosmetic result, chronic pain, neurovascular injury, numbness, seroma, hematoma, deep venous thrombosis, skin flap necrosis, disease recurrence and the possibility of requiring additional surgery. We also discussed other treatment options including the option of not undergoing any surgical treatment and the risks associated with this including disease progression. She expressed an understanding of these factors and wished to proceed.    We discussed that in her case, systemic treatment would involve endocrine therapy and possibly chemotherapy. She will be referred to medical oncology postoperatively to discuss this further.     DEVORAH VARGAS M.D.  General and Endoscopic Surgery  [facility]    [address], [address]  [place], [place], [address]  P: [phone]  F: [phone]

## 2024-08-02 ENCOUNTER — PATIENT OUTREACH (OUTPATIENT)
Dept: OTHER | Facility: HOSPITAL | Age: 54
End: 2024-08-02
Payer: COMMERCIAL

## 2024-08-02 NOTE — PROGRESS NOTES
Ms. Julio Cesar called regarding her MRI date and plastic surgery questions. We discussed those and message sent to Dr. Rea's office for follow up. She was thankful for the help.

## 2024-08-15 ENCOUNTER — HOSPITAL ENCOUNTER (OUTPATIENT)
Dept: MRI IMAGING | Facility: HOSPITAL | Age: 54
Discharge: HOME | End: 2024-08-15
Admitting: STUDENT IN AN ORGANIZED HEALTH CARE EDUCATION/TRAINING PROGRAM
Payer: COMMERCIAL

## 2024-08-15 DIAGNOSIS — C50.919 MALIGNANT NEOPLASM OF FEMALE BREAST, UNSPECIFIED ESTROGEN RECEPTOR STATUS, UNSPECIFIED LATERALITY, UNSPECIFIED SITE OF BREAST: ICD-10-CM

## 2024-08-15 PROCEDURE — 0 GADOBENATE DIMEGLUMINE 529 MG/ML SOLUTION: Performed by: STUDENT IN AN ORGANIZED HEALTH CARE EDUCATION/TRAINING PROGRAM

## 2024-08-15 PROCEDURE — A9577 INJ MULTIHANCE: HCPCS | Performed by: STUDENT IN AN ORGANIZED HEALTH CARE EDUCATION/TRAINING PROGRAM

## 2024-08-15 PROCEDURE — 77049 MRI BREAST C-+ W/CAD BI: CPT

## 2024-08-15 RX ADMIN — GADOBENATE DIMEGLUMINE 20 ML: 529 INJECTION, SOLUTION INTRAVENOUS at 09:31

## 2024-08-16 ENCOUNTER — TELEPHONE (OUTPATIENT)
Dept: SURGERY | Facility: CLINIC | Age: 54
End: 2024-08-16
Payer: COMMERCIAL

## 2024-08-16 ENCOUNTER — PATIENT OUTREACH (OUTPATIENT)
Dept: OTHER | Facility: HOSPITAL | Age: 54
End: 2024-08-16
Payer: COMMERCIAL

## 2024-08-16 NOTE — PROGRESS NOTES
Ms. Harrell called with questions about her MRI results. She was able to see the results in My Chart and wanted to know if she needs another biopsy. Message sent to Dr. Rea to help review. She was thankful for the help.

## 2024-08-20 DIAGNOSIS — C50.919 MALIGNANT NEOPLASM OF FEMALE BREAST, UNSPECIFIED ESTROGEN RECEPTOR STATUS, UNSPECIFIED LATERALITY, UNSPECIFIED SITE OF BREAST: Primary | ICD-10-CM

## 2024-08-21 ENCOUNTER — TRANSCRIBE ORDERS (OUTPATIENT)
Dept: SURGERY | Facility: CLINIC | Age: 54
End: 2024-08-21
Payer: COMMERCIAL

## 2024-08-21 ENCOUNTER — HOSPITAL ENCOUNTER (OUTPATIENT)
Dept: MAMMOGRAPHY | Facility: HOSPITAL | Age: 54
Discharge: HOME | End: 2024-08-21
Payer: COMMERCIAL

## 2024-08-21 ENCOUNTER — HOSPITAL ENCOUNTER (OUTPATIENT)
Dept: ULTRASOUND IMAGING | Facility: HOSPITAL | Age: 54
Discharge: HOME | End: 2024-08-21
Payer: COMMERCIAL

## 2024-08-21 DIAGNOSIS — C50.919 MALIGNANT NEOPLASM OF FEMALE BREAST, UNSPECIFIED ESTROGEN RECEPTOR STATUS, UNSPECIFIED LATERALITY, UNSPECIFIED SITE OF BREAST: ICD-10-CM

## 2024-08-21 DIAGNOSIS — R92.8 ABNORMAL MAMMOGRAM: Primary | ICD-10-CM

## 2024-08-21 PROCEDURE — G0279 TOMOSYNTHESIS, MAMMO: HCPCS

## 2024-08-21 PROCEDURE — 76642 ULTRASOUND BREAST LIMITED: CPT

## 2024-08-21 PROCEDURE — 77065 DX MAMMO INCL CAD UNI: CPT

## 2024-08-22 ENCOUNTER — PREP FOR SURGERY (OUTPATIENT)
Dept: OTHER | Facility: HOSPITAL | Age: 54
End: 2024-08-22
Payer: COMMERCIAL

## 2024-08-22 DIAGNOSIS — C50.811 BILATERAL MALIGNANT NEOPLASM OF OVERLAPPING SITES OF BREAST IN FEMALE, UNSPECIFIED ESTROGEN RECEPTOR STATUS: Primary | ICD-10-CM

## 2024-08-22 DIAGNOSIS — C50.919 MALIGNANT NEOPLASM OF FEMALE BREAST, UNSPECIFIED ESTROGEN RECEPTOR STATUS, UNSPECIFIED LATERALITY, UNSPECIFIED SITE OF BREAST: ICD-10-CM

## 2024-08-22 DIAGNOSIS — C50.919 MALIGNANT NEOPLASM OF FEMALE BREAST, UNSPECIFIED ESTROGEN RECEPTOR STATUS, UNSPECIFIED LATERALITY, UNSPECIFIED SITE OF BREAST: Primary | ICD-10-CM

## 2024-08-22 DIAGNOSIS — C50.812 BILATERAL MALIGNANT NEOPLASM OF OVERLAPPING SITES OF BREAST IN FEMALE, UNSPECIFIED ESTROGEN RECEPTOR STATUS: Primary | ICD-10-CM

## 2024-08-22 RX ORDER — DIAZEPAM 5 MG/1
5 TABLET ORAL ONCE
OUTPATIENT
Start: 2024-08-22 | End: 2024-08-22

## 2024-08-23 ENCOUNTER — PREP FOR SURGERY (OUTPATIENT)
Dept: OTHER | Facility: HOSPITAL | Age: 54
End: 2024-08-23
Payer: COMMERCIAL

## 2024-08-23 DIAGNOSIS — C50.812 BILATERAL MALIGNANT NEOPLASM OF OVERLAPPING SITES OF BREAST IN FEMALE, UNSPECIFIED ESTROGEN RECEPTOR STATUS: Primary | ICD-10-CM

## 2024-08-23 DIAGNOSIS — C50.811 BILATERAL MALIGNANT NEOPLASM OF OVERLAPPING SITES OF BREAST IN FEMALE, UNSPECIFIED ESTROGEN RECEPTOR STATUS: Primary | ICD-10-CM

## 2024-08-23 PROBLEM — C50.919 MALIGNANT NEOPLASM OF FEMALE BREAST: Status: ACTIVE | Noted: 2024-08-22

## 2024-09-03 NOTE — PROGRESS NOTES
09/04/24 0001   Pre-Procedure Phone Call   Procedure Time Verified Yes   Arrival Time 1330   Procedure Location Verified Yes   Medical History Reviewed Yes   NPO Status Reinforced No   Ride and Caregiver Arranged No   Patient Knows to Bring Current Medications No   Bring Outside Films Requested No

## 2024-09-04 ENCOUNTER — HOSPITAL ENCOUNTER (OUTPATIENT)
Dept: ULTRASOUND IMAGING | Facility: HOSPITAL | Age: 54
Discharge: HOME | End: 2024-09-04
Payer: COMMERCIAL

## 2024-09-04 ENCOUNTER — HOSPITAL ENCOUNTER (OUTPATIENT)
Dept: MAMMOGRAPHY | Facility: HOSPITAL | Age: 54
Discharge: HOME | End: 2024-09-04
Payer: COMMERCIAL

## 2024-09-04 VITALS
DIASTOLIC BLOOD PRESSURE: 80 MMHG | WEIGHT: 210 LBS | SYSTOLIC BLOOD PRESSURE: 140 MMHG | BODY MASS INDEX: 32.96 KG/M2 | RESPIRATION RATE: 16 BRPM | HEART RATE: 65 BPM | OXYGEN SATURATION: 98 % | HEIGHT: 67 IN | TEMPERATURE: 98 F

## 2024-09-04 DIAGNOSIS — R92.8 ABNORMAL MAMMOGRAM: ICD-10-CM

## 2024-09-04 DIAGNOSIS — C50.919 MALIGNANT NEOPLASM OF FEMALE BREAST, UNSPECIFIED ESTROGEN RECEPTOR STATUS, UNSPECIFIED LATERALITY, UNSPECIFIED SITE OF BREAST: ICD-10-CM

## 2024-09-04 PROCEDURE — 77065 DX MAMMO INCL CAD UNI: CPT

## 2024-09-04 PROCEDURE — A4648 IMPLANTABLE TISSUE MARKER: HCPCS

## 2024-09-04 PROCEDURE — 25010000002 LIDOCAINE 1 % SOLUTION: Performed by: RADIOLOGY

## 2024-09-04 PROCEDURE — 88305 TISSUE EXAM BY PATHOLOGIST: CPT | Performed by: STUDENT IN AN ORGANIZED HEALTH CARE EDUCATION/TRAINING PROGRAM

## 2024-09-04 RX ORDER — CETIRIZINE HYDROCHLORIDE 10 MG/1
10 TABLET ORAL DAILY
COMMUNITY

## 2024-09-04 RX ORDER — LIDOCAINE HYDROCHLORIDE AND EPINEPHRINE 10; 10 MG/ML; UG/ML
10 INJECTION, SOLUTION INFILTRATION; PERINEURAL ONCE
Status: COMPLETED | OUTPATIENT
Start: 2024-09-04 | End: 2024-09-04

## 2024-09-04 RX ORDER — LIDOCAINE HYDROCHLORIDE 10 MG/ML
10 INJECTION, SOLUTION INFILTRATION; PERINEURAL ONCE
Status: COMPLETED | OUTPATIENT
Start: 2024-09-04 | End: 2024-09-04

## 2024-09-04 RX ADMIN — Medication 4 ML: at 14:33

## 2024-09-04 RX ADMIN — LIDOCAINE HYDROCHLORIDE 1.5 ML: 10 INJECTION, SOLUTION INFILTRATION; PERINEURAL at 14:33

## 2024-09-05 ENCOUNTER — PREP FOR SURGERY (OUTPATIENT)
Dept: OTHER | Facility: HOSPITAL | Age: 54
End: 2024-09-05
Payer: COMMERCIAL

## 2024-09-05 LAB
CYTO UR: NORMAL
LAB AP CASE REPORT: NORMAL
LAB AP CLINICAL INFORMATION: NORMAL
PATH REPORT.FINAL DX SPEC: NORMAL
PATH REPORT.GROSS SPEC: NORMAL

## 2024-09-06 ENCOUNTER — PRE-ADMISSION TESTING (OUTPATIENT)
Dept: PREADMISSION TESTING | Facility: HOSPITAL | Age: 54
End: 2024-09-06
Payer: COMMERCIAL

## 2024-09-06 ENCOUNTER — TELEPHONE (OUTPATIENT)
Dept: MAMMOGRAPHY | Facility: HOSPITAL | Age: 54
End: 2024-09-06
Payer: COMMERCIAL

## 2024-09-06 VITALS
SYSTOLIC BLOOD PRESSURE: 139 MMHG | HEIGHT: 67 IN | OXYGEN SATURATION: 98 % | WEIGHT: 214.2 LBS | HEART RATE: 72 BPM | DIASTOLIC BLOOD PRESSURE: 90 MMHG | BODY MASS INDEX: 33.62 KG/M2 | RESPIRATION RATE: 16 BRPM | TEMPERATURE: 97.1 F

## 2024-09-06 LAB
ANION GAP SERPL CALCULATED.3IONS-SCNC: 14.1 MMOL/L (ref 5–15)
BUN SERPL-MCNC: 14 MG/DL (ref 6–20)
BUN/CREAT SERPL: 18.2 (ref 7–25)
CALCIUM SPEC-SCNC: 9.7 MG/DL (ref 8.6–10.5)
CHLORIDE SERPL-SCNC: 98 MMOL/L (ref 98–107)
CO2 SERPL-SCNC: 24.9 MMOL/L (ref 22–29)
CREAT SERPL-MCNC: 0.77 MG/DL (ref 0.57–1)
DEPRECATED RDW RBC AUTO: 43.4 FL (ref 37–54)
EGFRCR SERPLBLD CKD-EPI 2021: 91.8 ML/MIN/1.73
ERYTHROCYTE [DISTWIDTH] IN BLOOD BY AUTOMATED COUNT: 12.4 % (ref 12.3–15.4)
GLUCOSE SERPL-MCNC: 115 MG/DL (ref 65–99)
HCT VFR BLD AUTO: 43 % (ref 34–46.6)
HGB BLD-MCNC: 14.6 G/DL (ref 12–15.9)
MCH RBC QN AUTO: 32.4 PG (ref 26.6–33)
MCHC RBC AUTO-ENTMCNC: 34 G/DL (ref 31.5–35.7)
MCV RBC AUTO: 95.6 FL (ref 79–97)
PLATELET # BLD AUTO: 307 10*3/MM3 (ref 140–450)
PMV BLD AUTO: 9.4 FL (ref 6–12)
POTASSIUM SERPL-SCNC: 4.1 MMOL/L (ref 3.5–5.2)
RBC # BLD AUTO: 4.5 10*6/MM3 (ref 3.77–5.28)
SODIUM SERPL-SCNC: 137 MMOL/L (ref 136–145)
WBC NRBC COR # BLD AUTO: 5.87 10*3/MM3 (ref 3.4–10.8)

## 2024-09-06 PROCEDURE — 93005 ELECTROCARDIOGRAM TRACING: CPT

## 2024-09-06 PROCEDURE — 36415 COLL VENOUS BLD VENIPUNCTURE: CPT

## 2024-09-06 PROCEDURE — 80048 BASIC METABOLIC PNL TOTAL CA: CPT

## 2024-09-06 PROCEDURE — 85027 COMPLETE CBC AUTOMATED: CPT

## 2024-09-06 NOTE — DISCHARGE INSTRUCTIONS
Take the following medications the morning of surgery:  PAXIL, AMLODIPINE, ZYRTEC AND METOPROLOL    BRING CPAP TO PRE OP    If you are on prescription narcotic pain medication to control your pain you may also take that medication the morning of surgery.      General Instructions:     Do not eat solid food after midnight the night before surgery.  Clear liquids day of surgery are allowed but must be stopped at least two hours before your hospital arrival time.       Allowed clear liquids      Water, sodas, and tea or coffee with no cream or milk added.       12 to 20 ounces of a clear liquid that contains carbohydrates is recommended.  If non-diabetic, have Gatorade or Powerade.  If diabetic, have G2 or Powerade Zero.     Do not have liquids red in color.  Do not consume chicken, beef, pork or vegetable broth or bouillon cubes of any variety as they are not considered clear liquids and are not allowed.        Patients who avoid smoking, chewing tobacco and alcohol for 4 weeks prior to surgery have a reduced risk of post-operative complications.  Quit smoking as many days before surgery as you can.  Do not smoke, use chewing tobacco or drink alcohol the day of surgery.   If applicable bring your C-PAP/ BI-PAP machine in with you to preop day of surgery.  Bring any papers given to you in the doctor’s office.  Wear clean comfortable clothes.  Do not wear contact lenses, false eyelashes or make-up.  Bring a case for your glasses.   Bring crutches or walker if applicable.  Remove all piercings.  Leave jewelry and any other valuables at home.  Hair extensions with metal clips must be removed prior to surgery.  The Pre-Admission Testing nurse will instruct you to bring medications if unable to obtain an accurate list in Pre-Admission Testing.          Preventing a Surgical Site Infection:  For 2 to 3 days before surgery, avoid shaving with a razor because the razor can irritate skin and make it easier to develop an  infection.    Any areas of open skin can increase the risk of a post-operative wound infection by allowing bacteria to enter and travel throughout the body.  Notify your surgeon if you have any skin wounds / rashes even if it is not near the expected surgical site.  The area will need assessed to determine if surgery should be delayed until it is healed.  The night prior to surgery shower using a fresh bar of anti-bacterial soap (such as Dial) and clean washcloth.  Sleep in a clean bed with clean clothing.  Do not allow pets to sleep with you.  Shower on the morning of surgery using a fresh bar of anti-bacterial soap (such as Dial) and clean washcloth.  Dry with a clean towel and dress in clean clothing.  Ask your surgeon if you will be receiving antibiotics prior to surgery.  Make sure you, your family, and all healthcare providers clean their hands with soap and water or an alcohol based hand  before caring for you or your wound.    Day of surgery:  Your arrival time is approximately two hours before your scheduled surgery time.  Please note if you have an early arrival time the surgery doors do not open before 5:00 AM.  Upon arrival, a Pre-op nurse and Anesthesiologist will review your health history, obtain vital signs, and answer questions you may have.  The only belongings needed at this time will be a list of your home medications and if applicable your C-PAP/BI-PAP machine.  A Pre-op nurse will start an IV and you may receive medication in preparation for surgery, including something to help you relax.     Please be aware that surgery does come with discomfort.  We want to make every effort to control your discomfort so please discuss any uncontrolled symptoms with your nurse.   Your doctor will most likely have prescribed pain medications.      If you are going home after surgery you will receive individualized written care instructions before being discharged.  A responsible adult must drive you  to and from the hospital on the day of your surgery and ideally stay with you through the night.   .  Discharge prescriptions can be filled by the hospital pharmacy during regular pharmacy hours.  If you are having surgery late in the day/evening your prescription may be e-prescribed to your pharmacy.  Please verify your pharmacy hours or chose a 24 hour pharmacy to avoid not having access to your prescription because your pharmacy has closed for the day.    If you are staying overnight following surgery, you will be transported to your hospital room following the recovery period.  Psychiatric has all private rooms.    If you have any questions please call Pre-Admission Testing at [phone].  Deductibles and co-payments are collected on the day of service. Please be prepared to pay the required co-pay, deductible or deposit on the day of service as defined by your plan.    Call your surgeon immediately if you experience any of the following symptoms:  Sore Throat  Shortness of Breath or difficulty breathing  Cough  Chills  Body soreness or muscle pain  Headache  Fever  New loss of taste or smell  Do not arrive for your surgery ill.  Your procedure will need to be rescheduled to another time.  You will need to call your physician before the day of surgery to avoid any unnecessary exposure to hospital staff as well as other patients.

## 2024-09-06 NOTE — TELEPHONE ENCOUNTER
Called Mrs. Harrell Regarding her recent Right breast biopsy results. Her breast biopsy results returned as benign. She can return to her annual screening mammogram due (month/year). She voiced understanding and will call with further questions or concerns.

## 2024-09-07 LAB
QT INTERVAL: 401 MS
QTC INTERVAL: 408 MS

## 2024-09-09 ENCOUNTER — PATIENT OUTREACH (OUTPATIENT)
Dept: OTHER | Facility: HOSPITAL | Age: 54
End: 2024-09-09
Payer: COMMERCIAL

## 2024-09-09 ENCOUNTER — PREP FOR SURGERY (OUTPATIENT)
Dept: OTHER | Facility: HOSPITAL | Age: 54
End: 2024-09-09
Payer: COMMERCIAL

## 2024-09-09 NOTE — PROGRESS NOTES
Ms. Harrell called with questions about her pathology from the last biopsy. She is wondering if that needed to come out with the rest of the surgery on Thursday. Message sent to Dr. Rea to evaluate and let her know next steps. She was thankful for the help.

## 2024-09-12 ENCOUNTER — HOSPITAL ENCOUNTER (OUTPATIENT)
Dept: NUCLEAR MEDICINE | Facility: HOSPITAL | Age: 54
Discharge: HOME | End: 2024-09-12
Payer: COMMERCIAL

## 2024-09-12 ENCOUNTER — ANESTHESIA (OUTPATIENT)
Dept: PERIOP | Facility: HOSPITAL | Age: 54
End: 2024-09-12
Payer: COMMERCIAL

## 2024-09-12 ENCOUNTER — ANESTHESIA EVENT (OUTPATIENT)
Dept: PERIOP | Facility: HOSPITAL | Age: 54
End: 2024-09-12
Payer: COMMERCIAL

## 2024-09-12 ENCOUNTER — APPOINTMENT (OUTPATIENT)
Dept: MAMMOGRAPHY | Facility: HOSPITAL | Age: 54
End: 2024-09-12
Payer: COMMERCIAL

## 2024-09-12 ENCOUNTER — APPOINTMENT (OUTPATIENT)
Dept: GENERAL RADIOLOGY | Facility: HOSPITAL | Age: 54
End: 2024-09-12
Payer: COMMERCIAL

## 2024-09-12 ENCOUNTER — HOSPITAL ENCOUNTER (OUTPATIENT)
Facility: HOSPITAL | Age: 54
Setting detail: HOSPITAL OUTPATIENT SURGERY
Discharge: HOME | End: 2024-09-12
Attending: STUDENT IN AN ORGANIZED HEALTH CARE EDUCATION/TRAINING PROGRAM | Admitting: STUDENT IN AN ORGANIZED HEALTH CARE EDUCATION/TRAINING PROGRAM
Payer: COMMERCIAL

## 2024-09-12 ENCOUNTER — HOSPITAL ENCOUNTER (OUTPATIENT)
Dept: MAMMOGRAPHY | Facility: HOSPITAL | Age: 54
Discharge: HOME | End: 2024-09-12
Payer: COMMERCIAL

## 2024-09-12 VITALS
DIASTOLIC BLOOD PRESSURE: 78 MMHG | HEART RATE: 93 BPM | TEMPERATURE: 98.8 F | RESPIRATION RATE: 16 BRPM | WEIGHT: 214.29 LBS | SYSTOLIC BLOOD PRESSURE: 115 MMHG | BODY MASS INDEX: 33.56 KG/M2 | OXYGEN SATURATION: 94 %

## 2024-09-12 DIAGNOSIS — C50.811 BILATERAL MALIGNANT NEOPLASM OF OVERLAPPING SITES OF BREAST IN FEMALE, UNSPECIFIED ESTROGEN RECEPTOR STATUS: ICD-10-CM

## 2024-09-12 DIAGNOSIS — C50.812 BILATERAL MALIGNANT NEOPLASM OF OVERLAPPING SITES OF BREAST IN FEMALE, UNSPECIFIED ESTROGEN RECEPTOR STATUS: ICD-10-CM

## 2024-09-12 DIAGNOSIS — C50.919 MALIGNANT NEOPLASM OF FEMALE BREAST, UNSPECIFIED ESTROGEN RECEPTOR STATUS, UNSPECIFIED LATERALITY, UNSPECIFIED SITE OF BREAST: ICD-10-CM

## 2024-09-12 LAB
B-HCG UR QL: NEGATIVE
EXPIRATION DATE: NORMAL
INTERNAL NEGATIVE CONTROL: NORMAL
INTERNAL POSITIVE CONTROL: NORMAL
Lab: NORMAL

## 2024-09-12 PROCEDURE — 25010000002 CLINDAMYCIN 900 MG/50ML SOLUTION: Performed by: STUDENT IN AN ORGANIZED HEALTH CARE EDUCATION/TRAINING PROGRAM

## 2024-09-12 PROCEDURE — 25010000002 FENTANYL CITRATE (PF) 50 MCG/ML SOLUTION: Performed by: NURSE ANESTHETIST, CERTIFIED REGISTERED

## 2024-09-12 PROCEDURE — 88307 TISSUE EXAM BY PATHOLOGIST: CPT | Performed by: STUDENT IN AN ORGANIZED HEALTH CARE EDUCATION/TRAINING PROGRAM

## 2024-09-12 PROCEDURE — 25010000002 BUPIVACAINE (PF) 0.25 % SOLUTION 10 ML VIAL: Performed by: STUDENT IN AN ORGANIZED HEALTH CARE EDUCATION/TRAINING PROGRAM

## 2024-09-12 PROCEDURE — 25010000002 MIDAZOLAM PER 1 MG: Performed by: NURSE ANESTHETIST, CERTIFIED REGISTERED

## 2024-09-12 PROCEDURE — 0 TECHETIUM TC99M TILMANOCEPT: Performed by: STUDENT IN AN ORGANIZED HEALTH CARE EDUCATION/TRAINING PROGRAM

## 2024-09-12 PROCEDURE — 0 BUPIVACAINE LIPOSOME 1.3 % SUSPENSION 20 ML VIAL: Performed by: STUDENT IN AN ORGANIZED HEALTH CARE EDUCATION/TRAINING PROGRAM

## 2024-09-12 PROCEDURE — C1819 TISSUE LOCALIZATION-EXCISION: HCPCS

## 2024-09-12 PROCEDURE — 88341 IMHCHEM/IMCYTCHM EA ADD ANTB: CPT | Performed by: STUDENT IN AN ORGANIZED HEALTH CARE EDUCATION/TRAINING PROGRAM

## 2024-09-12 PROCEDURE — 76098 X-RAY EXAM SURGICAL SPECIMEN: CPT

## 2024-09-12 PROCEDURE — 81025 URINE PREGNANCY TEST: CPT | Performed by: STUDENT IN AN ORGANIZED HEALTH CARE EDUCATION/TRAINING PROGRAM

## 2024-09-12 PROCEDURE — A9520 TC99 TILMANOCEPT DIAG 0.5MCI: HCPCS | Performed by: STUDENT IN AN ORGANIZED HEALTH CARE EDUCATION/TRAINING PROGRAM

## 2024-09-12 PROCEDURE — 38792 RA TRACER ID OF SENTINL NODE: CPT

## 2024-09-12 PROCEDURE — 25010000002 ISOSULFAN BLUE 1 % SOLUTION: Performed by: STUDENT IN AN ORGANIZED HEALTH CARE EDUCATION/TRAINING PROGRAM

## 2024-09-12 PROCEDURE — 25010000002 DEXAMETHASONE PER 1 MG: Performed by: NURSE ANESTHETIST, CERTIFIED REGISTERED

## 2024-09-12 PROCEDURE — 25810000003 LACTATED RINGERS PER 1000 ML: Performed by: STUDENT IN AN ORGANIZED HEALTH CARE EDUCATION/TRAINING PROGRAM

## 2024-09-12 PROCEDURE — 25010000002 PROPOFOL 10 MG/ML EMULSION: Performed by: NURSE ANESTHETIST, CERTIFIED REGISTERED

## 2024-09-12 PROCEDURE — 88360 TUMOR IMMUNOHISTOCHEM/MANUAL: CPT | Performed by: STUDENT IN AN ORGANIZED HEALTH CARE EDUCATION/TRAINING PROGRAM

## 2024-09-12 PROCEDURE — C9290 INJ, BUPIVACAINE LIPOSOME: HCPCS | Performed by: STUDENT IN AN ORGANIZED HEALTH CARE EDUCATION/TRAINING PROGRAM

## 2024-09-12 PROCEDURE — 88342 IMHCHEM/IMCYTCHM 1ST ANTB: CPT | Performed by: STUDENT IN AN ORGANIZED HEALTH CARE EDUCATION/TRAINING PROGRAM

## 2024-09-12 PROCEDURE — 25010000002 GLYCOPYRROLATE 0.2 MG/ML SOLUTION: Performed by: NURSE ANESTHETIST, CERTIFIED REGISTERED

## 2024-09-12 PROCEDURE — 25010000002 HYDROMORPHONE 1 MG/ML SOLUTION: Performed by: STUDENT IN AN ORGANIZED HEALTH CARE EDUCATION/TRAINING PROGRAM

## 2024-09-12 PROCEDURE — 25010000002 ONDANSETRON PER 1 MG: Performed by: STUDENT IN AN ORGANIZED HEALTH CARE EDUCATION/TRAINING PROGRAM

## 2024-09-12 DEVICE — CLIP LIGAT VASC HORIZON TI MD BLU 6CT: Type: IMPLANTABLE DEVICE | Site: BREAST | Status: FUNCTIONAL

## 2024-09-12 DEVICE — KNOTLESS TISSUE CONTROL DEVICE, UNDYED UNIDIRECTIONAL (ANTIBACTERIAL) SYNTHETIC ABSORBABLE DEVICE
Type: IMPLANTABLE DEVICE | Site: BREAST | Status: FUNCTIONAL
Brand: STRATAFIX

## 2024-09-12 DEVICE — ARISTA AH ABSORBABLE HEMOSTATIC PARTICLES
Type: IMPLANTABLE DEVICE | Site: BREAST | Status: FUNCTIONAL
Brand: ARISTA™ AH

## 2024-09-12 RX ORDER — PROMETHAZINE HYDROCHLORIDE 25 MG/1
25 TABLET ORAL ONCE AS NEEDED
Status: DISCONTINUED | OUTPATIENT
Start: 2024-09-12 | End: 2024-09-12 | Stop reason: HOSPADM

## 2024-09-12 RX ORDER — LIDOCAINE 40 MG/G
1 CREAM TOPICAL ONCE
Status: COMPLETED | OUTPATIENT
Start: 2024-09-12 | End: 2024-09-12

## 2024-09-12 RX ORDER — PHENYLEPHRINE HCL IN 0.9% NACL 1 MG/10 ML
SYRINGE (ML) INTRAVENOUS AS NEEDED
Status: DISCONTINUED | OUTPATIENT
Start: 2024-09-12 | End: 2024-09-12 | Stop reason: SURG

## 2024-09-12 RX ORDER — HYDRALAZINE HYDROCHLORIDE 20 MG/ML
5 INJECTION INTRAMUSCULAR; INTRAVENOUS
Status: DISCONTINUED | OUTPATIENT
Start: 2024-09-12 | End: 2024-09-12 | Stop reason: HOSPADM

## 2024-09-12 RX ORDER — MIDAZOLAM HYDROCHLORIDE 1 MG/ML
1 INJECTION INTRAMUSCULAR; INTRAVENOUS
Status: DISCONTINUED | OUTPATIENT
Start: 2024-09-12 | End: 2024-09-12

## 2024-09-12 RX ORDER — LIDOCAINE HYDROCHLORIDE 20 MG/ML
INJECTION, SOLUTION INFILTRATION; PERINEURAL AS NEEDED
Status: DISCONTINUED | OUTPATIENT
Start: 2024-09-12 | End: 2024-09-12 | Stop reason: SURG

## 2024-09-12 RX ORDER — CLINDAMYCIN PHOSPHATE 900 MG/50ML
900 INJECTION, SOLUTION INTRAVENOUS ONCE
Status: COMPLETED | OUTPATIENT
Start: 2024-09-12 | End: 2024-09-12

## 2024-09-12 RX ORDER — DROPERIDOL 2.5 MG/ML
0.62 INJECTION, SOLUTION INTRAMUSCULAR; INTRAVENOUS
Status: DISCONTINUED | OUTPATIENT
Start: 2024-09-12 | End: 2024-09-12 | Stop reason: HOSPADM

## 2024-09-12 RX ORDER — LIDOCAINE HYDROCHLORIDE 10 MG/ML
3 INJECTION, SOLUTION INFILTRATION; PERINEURAL ONCE
Status: DISCONTINUED | OUTPATIENT
Start: 2024-09-12 | End: 2024-09-13 | Stop reason: HOSPADM

## 2024-09-12 RX ORDER — FENTANYL CITRATE 50 UG/ML
50 INJECTION, SOLUTION INTRAMUSCULAR; INTRAVENOUS ONCE AS NEEDED
Status: DISCONTINUED | OUTPATIENT
Start: 2024-09-12 | End: 2024-09-12

## 2024-09-12 RX ORDER — LABETALOL HYDROCHLORIDE 5 MG/ML
5 INJECTION, SOLUTION INTRAVENOUS
Status: DISCONTINUED | OUTPATIENT
Start: 2024-09-12 | End: 2024-09-12 | Stop reason: HOSPADM

## 2024-09-12 RX ORDER — LIDOCAINE HYDROCHLORIDE 10 MG/ML
0.5 INJECTION, SOLUTION INFILTRATION; PERINEURAL ONCE AS NEEDED
Status: DISCONTINUED | OUTPATIENT
Start: 2024-09-12 | End: 2024-09-12 | Stop reason: HOSPADM

## 2024-09-12 RX ORDER — HYDROMORPHONE HYDROCHLORIDE 1 MG/ML
0.5 INJECTION, SOLUTION INTRAMUSCULAR; INTRAVENOUS; SUBCUTANEOUS
Status: DISCONTINUED | OUTPATIENT
Start: 2024-09-12 | End: 2024-09-12 | Stop reason: HOSPADM

## 2024-09-12 RX ORDER — BUPIVACAINE HYDROCHLORIDE AND EPINEPHRINE 5; 5 MG/ML; UG/ML
INJECTION, SOLUTION EPIDURAL; INTRACAUDAL; PERINEURAL AS NEEDED
Status: DISCONTINUED | OUTPATIENT
Start: 2024-09-12 | End: 2024-09-12 | Stop reason: HOSPADM

## 2024-09-12 RX ORDER — PROPOFOL 10 MG/ML
VIAL (ML) INTRAVENOUS AS NEEDED
Status: DISCONTINUED | OUTPATIENT
Start: 2024-09-12 | End: 2024-09-12 | Stop reason: SURG

## 2024-09-12 RX ORDER — LIDOCAINE HYDROCHLORIDE 10 MG/ML
3 INJECTION, SOLUTION INFILTRATION; PERINEURAL ONCE
Status: DISCONTINUED | OUTPATIENT
Start: 2024-09-12 | End: 2024-09-12 | Stop reason: HOSPADM

## 2024-09-12 RX ORDER — SODIUM CHLORIDE 0.9 % (FLUSH) 0.9 %
3 SYRINGE (ML) INJECTION EVERY 12 HOURS SCHEDULED
Status: DISCONTINUED | OUTPATIENT
Start: 2024-09-12 | End: 2024-09-12 | Stop reason: HOSPADM

## 2024-09-12 RX ORDER — FAMOTIDINE 10 MG/ML
20 INJECTION, SOLUTION INTRAVENOUS ONCE
Status: COMPLETED | OUTPATIENT
Start: 2024-09-12 | End: 2024-09-12

## 2024-09-12 RX ORDER — IPRATROPIUM BROMIDE AND ALBUTEROL SULFATE 2.5; .5 MG/3ML; MG/3ML
3 SOLUTION RESPIRATORY (INHALATION) ONCE AS NEEDED
Status: DISCONTINUED | OUTPATIENT
Start: 2024-09-12 | End: 2024-09-12 | Stop reason: HOSPADM

## 2024-09-12 RX ORDER — PROMETHAZINE HYDROCHLORIDE 25 MG/1
25 SUPPOSITORY RECTAL ONCE AS NEEDED
Status: DISCONTINUED | OUTPATIENT
Start: 2024-09-12 | End: 2024-09-12 | Stop reason: HOSPADM

## 2024-09-12 RX ORDER — DEXAMETHASONE SODIUM PHOSPHATE 4 MG/ML
INJECTION, SOLUTION INTRA-ARTICULAR; INTRALESIONAL; INTRAMUSCULAR; INTRAVENOUS; SOFT TISSUE AS NEEDED
Status: DISCONTINUED | OUTPATIENT
Start: 2024-09-12 | End: 2024-09-12 | Stop reason: SURG

## 2024-09-12 RX ORDER — MIDAZOLAM HYDROCHLORIDE 1 MG/ML
INJECTION INTRAMUSCULAR; INTRAVENOUS AS NEEDED
Status: DISCONTINUED | OUTPATIENT
Start: 2024-09-12 | End: 2024-09-12 | Stop reason: SURG

## 2024-09-12 RX ORDER — EPHEDRINE SULFATE 50 MG/ML
5 INJECTION, SOLUTION INTRAVENOUS ONCE AS NEEDED
Status: DISCONTINUED | OUTPATIENT
Start: 2024-09-12 | End: 2024-09-12 | Stop reason: HOSPADM

## 2024-09-12 RX ORDER — CLINDAMYCIN PHOSPHATE 900 MG/50ML
900 INJECTION, SOLUTION INTRAVENOUS EVERY 8 HOURS SCHEDULED
Status: DISCONTINUED | OUTPATIENT
Start: 2024-09-12 | End: 2024-09-12 | Stop reason: HOSPADM

## 2024-09-12 RX ORDER — DIAZEPAM 5 MG
5 TABLET ORAL ONCE
Status: COMPLETED | OUTPATIENT
Start: 2024-09-12 | End: 2024-09-12

## 2024-09-12 RX ORDER — HYDROCODONE BITARTRATE AND ACETAMINOPHEN 5; 325 MG/1; MG/1
1 TABLET ORAL ONCE AS NEEDED
Status: COMPLETED | OUTPATIENT
Start: 2024-09-12 | End: 2024-09-12

## 2024-09-12 RX ORDER — MIDAZOLAM HYDROCHLORIDE 1 MG/ML
2 INJECTION INTRAMUSCULAR; INTRAVENOUS
Status: DISCONTINUED | OUTPATIENT
Start: 2024-09-12 | End: 2024-09-12 | Stop reason: HOSPADM

## 2024-09-12 RX ORDER — FENTANYL CITRATE 50 UG/ML
50 INJECTION, SOLUTION INTRAMUSCULAR; INTRAVENOUS
Status: DISCONTINUED | OUTPATIENT
Start: 2024-09-12 | End: 2024-09-12 | Stop reason: HOSPADM

## 2024-09-12 RX ORDER — FLUMAZENIL 0.1 MG/ML
0.2 INJECTION INTRAVENOUS AS NEEDED
Status: DISCONTINUED | OUTPATIENT
Start: 2024-09-12 | End: 2024-09-12 | Stop reason: HOSPADM

## 2024-09-12 RX ORDER — NALOXONE HCL 0.4 MG/ML
0.2 VIAL (ML) INJECTION AS NEEDED
Status: DISCONTINUED | OUTPATIENT
Start: 2024-09-12 | End: 2024-09-12 | Stop reason: HOSPADM

## 2024-09-12 RX ORDER — SODIUM CHLORIDE 0.9 % (FLUSH) 0.9 %
3-10 SYRINGE (ML) INJECTION AS NEEDED
Status: DISCONTINUED | OUTPATIENT
Start: 2024-09-12 | End: 2024-09-12 | Stop reason: HOSPADM

## 2024-09-12 RX ORDER — OXYCODONE AND ACETAMINOPHEN 7.5; 325 MG/1; MG/1
1 TABLET ORAL EVERY 4 HOURS PRN
Status: DISCONTINUED | OUTPATIENT
Start: 2024-09-12 | End: 2024-09-12 | Stop reason: HOSPADM

## 2024-09-12 RX ORDER — SODIUM CHLORIDE, SODIUM LACTATE, POTASSIUM CHLORIDE, CALCIUM CHLORIDE 600; 310; 30; 20 MG/100ML; MG/100ML; MG/100ML; MG/100ML
9 INJECTION, SOLUTION INTRAVENOUS CONTINUOUS
Status: DISCONTINUED | OUTPATIENT
Start: 2024-09-12 | End: 2024-09-12 | Stop reason: HOSPADM

## 2024-09-12 RX ORDER — ONDANSETRON 2 MG/ML
4 INJECTION INTRAMUSCULAR; INTRAVENOUS ONCE AS NEEDED
Status: DISCONTINUED | OUTPATIENT
Start: 2024-09-12 | End: 2024-09-12 | Stop reason: HOSPADM

## 2024-09-12 RX ORDER — FENTANYL CITRATE 50 UG/ML
INJECTION, SOLUTION INTRAMUSCULAR; INTRAVENOUS AS NEEDED
Status: DISCONTINUED | OUTPATIENT
Start: 2024-09-12 | End: 2024-09-12 | Stop reason: SURG

## 2024-09-12 RX ORDER — ISOSULFAN BLUE 50 MG/5ML
INJECTION, SOLUTION SUBCUTANEOUS AS NEEDED
Status: DISCONTINUED | OUTPATIENT
Start: 2024-09-12 | End: 2024-09-12 | Stop reason: HOSPADM

## 2024-09-12 RX ORDER — GLYCOPYRROLATE 0.2 MG/ML
INJECTION INTRAMUSCULAR; INTRAVENOUS AS NEEDED
Status: DISCONTINUED | OUTPATIENT
Start: 2024-09-12 | End: 2024-09-12 | Stop reason: SURG

## 2024-09-12 RX ORDER — DIPHENHYDRAMINE HYDROCHLORIDE 50 MG/ML
12.5 INJECTION INTRAMUSCULAR; INTRAVENOUS
Status: DISCONTINUED | OUTPATIENT
Start: 2024-09-12 | End: 2024-09-12 | Stop reason: HOSPADM

## 2024-09-12 RX ORDER — ROCURONIUM BROMIDE 10 MG/ML
INJECTION, SOLUTION INTRAVENOUS AS NEEDED
Status: DISCONTINUED | OUTPATIENT
Start: 2024-09-12 | End: 2024-09-12 | Stop reason: SURG

## 2024-09-12 RX ORDER — ONDANSETRON 2 MG/ML
INJECTION INTRAMUSCULAR; INTRAVENOUS AS NEEDED
Status: DISCONTINUED | OUTPATIENT
Start: 2024-09-12 | End: 2024-09-12 | Stop reason: SURG

## 2024-09-12 RX ADMIN — ONDANSETRON 4 MG: 2 INJECTION INTRAMUSCULAR; INTRAVENOUS at 16:57

## 2024-09-12 RX ADMIN — PROPOFOL 200 MG: 10 INJECTION, EMULSION INTRAVENOUS at 11:22

## 2024-09-12 RX ADMIN — Medication 100 MCG: at 15:35

## 2024-09-12 RX ADMIN — DIAZEPAM 5 MG: 5 TABLET ORAL at 06:43

## 2024-09-12 RX ADMIN — FENTANYL CITRATE 25 MCG: 50 INJECTION, SOLUTION INTRAMUSCULAR; INTRAVENOUS at 11:58

## 2024-09-12 RX ADMIN — DEXAMETHASONE SODIUM PHOSPHATE 8 MG: 4 INJECTION, SOLUTION INTRA-ARTICULAR; INTRALESIONAL; INTRAMUSCULAR; INTRAVENOUS; SOFT TISSUE at 11:26

## 2024-09-12 RX ADMIN — HYDROMORPHONE HYDROCHLORIDE 0.5 MG: 1 INJECTION, SOLUTION INTRAMUSCULAR; INTRAVENOUS; SUBCUTANEOUS at 15:56

## 2024-09-12 RX ADMIN — LIDOCAINE HYDROCHLORIDE 100 MG: 20 INJECTION, SOLUTION INFILTRATION; PERINEURAL at 11:22

## 2024-09-12 RX ADMIN — ROCURONIUM BROMIDE 20 MG: 10 INJECTION, SOLUTION INTRAVENOUS at 13:35

## 2024-09-12 RX ADMIN — FENTANYL CITRATE 25 MCG: 50 INJECTION, SOLUTION INTRAMUSCULAR; INTRAVENOUS at 11:56

## 2024-09-12 RX ADMIN — ROCURONIUM BROMIDE 20 MG: 10 INJECTION, SOLUTION INTRAVENOUS at 14:01

## 2024-09-12 RX ADMIN — TILMANOCEPT 1 DOSE: KIT at 09:47

## 2024-09-12 RX ADMIN — Medication 10 MG: at 13:21

## 2024-09-12 RX ADMIN — ROCURONIUM BROMIDE 20 MG: 10 INJECTION, SOLUTION INTRAVENOUS at 12:03

## 2024-09-12 RX ADMIN — Medication 10 MG: at 14:20

## 2024-09-12 RX ADMIN — Medication 30 MG: at 11:22

## 2024-09-12 RX ADMIN — SODIUM CHLORIDE, POTASSIUM CHLORIDE, SODIUM LACTATE AND CALCIUM CHLORIDE 9 ML/HR: 600; 310; 30; 20 INJECTION, SOLUTION INTRAVENOUS at 10:33

## 2024-09-12 RX ADMIN — ROCURONIUM BROMIDE 40 MG: 10 INJECTION, SOLUTION INTRAVENOUS at 11:22

## 2024-09-12 RX ADMIN — CLINDAMYCIN PHOSPHATE 900 MG: 900 INJECTION, SOLUTION INTRAVENOUS at 11:11

## 2024-09-12 RX ADMIN — Medication 10 MG: at 12:21

## 2024-09-12 RX ADMIN — ROCURONIUM BROMIDE 20 MG: 10 INJECTION, SOLUTION INTRAVENOUS at 14:45

## 2024-09-12 RX ADMIN — PROPOFOL 25 MCG/KG/MIN: 10 INJECTION, EMULSION INTRAVENOUS at 11:28

## 2024-09-12 RX ADMIN — FAMOTIDINE 20 MG: 10 INJECTION INTRAVENOUS at 10:34

## 2024-09-12 RX ADMIN — ROCURONIUM BROMIDE 20 MG: 10 INJECTION, SOLUTION INTRAVENOUS at 12:41

## 2024-09-12 RX ADMIN — LIDOCAINE 4% 1 APPLICATION: 4 CREAM TOPICAL at 07:50

## 2024-09-12 RX ADMIN — HYDROMORPHONE HYDROCHLORIDE 0.5 MG: 1 INJECTION, SOLUTION INTRAMUSCULAR; INTRAVENOUS; SUBCUTANEOUS at 15:46

## 2024-09-12 RX ADMIN — GLYCOPYRROLATE 0.2 MG: 0.2 INJECTION INTRAMUSCULAR; INTRAVENOUS at 11:22

## 2024-09-12 RX ADMIN — LIDOCAINE 4% 1 APPLICATION: 4 CREAM TOPICAL at 06:54

## 2024-09-12 RX ADMIN — FENTANYL CITRATE 25 MCG: 50 INJECTION, SOLUTION INTRAMUSCULAR; INTRAVENOUS at 13:43

## 2024-09-12 RX ADMIN — FENTANYL CITRATE 25 MCG: 50 INJECTION, SOLUTION INTRAMUSCULAR; INTRAVENOUS at 13:35

## 2024-09-12 RX ADMIN — CLINDAMYCIN PHOSPHATE 900 MG: 900 INJECTION, SOLUTION INTRAVENOUS at 17:02

## 2024-09-12 RX ADMIN — MIDAZOLAM 2 MG: 1 INJECTION INTRAMUSCULAR; INTRAVENOUS at 11:17

## 2024-09-12 RX ADMIN — Medication 10 MG: at 15:20

## 2024-09-12 RX ADMIN — Medication 10 MG: at 16:21

## 2024-09-12 RX ADMIN — ROCURONIUM BROMIDE 10 MG: 10 INJECTION, SOLUTION INTRAVENOUS at 15:21

## 2024-09-12 RX ADMIN — Medication 50 MCG: at 16:10

## 2024-09-12 RX ADMIN — HYDROCODONE BITARTRATE AND ACETAMINOPHEN 1 TABLET: 5; 325 TABLET ORAL at 18:53

## 2024-09-12 RX ADMIN — SODIUM CHLORIDE, POTASSIUM CHLORIDE, SODIUM LACTATE AND CALCIUM CHLORIDE: 600; 310; 30; 20 INJECTION, SOLUTION INTRAVENOUS at 13:09

## 2024-09-12 NOTE — ANESTHESIA PROCEDURE NOTES
Airway  Urgency: elective    Date/Time: 9/12/2024 11:25 AM  Airway not difficult    General Information and Staff    Patient location during procedure: OR  Anesthesiologist: Shyla Ku MD  CRNA/CAA: Renata Adams CRNA    Indications and Patient Condition  Indications for airway management: airway protection    Preoxygenated: yes  Mask difficulty assessment: 1 - vent by mask    Final Airway Details  Final airway type: endotracheal airway      Successful airway: ETT  Cuffed: yes   Successful intubation technique: direct laryngoscopy  Facilitating devices/methods: Bougie and anterior pressure/BURP  Endotracheal tube insertion site: oral  Blade: Jo  Blade size: 3  ETT size (mm): 7.0  Cormack-Lehane Classification: grade IIb - view of arytenoids or posterior of glottis only  Placement verified by: chest auscultation and capnometry   Measured from: lips  ETT/EBT  to lips (cm): 22  Number of attempts at approach: 1  Assessment: lips, teeth, and gum same as pre-op and atraumatic intubation

## 2024-09-12 NOTE — H&P
General Surgery Breast Cancer History and Physical Exam      Summary:    Chante Harrell is a 54 y.o. lady who presents with a new diagnosis of left breast invasive ductal carcinoma: Grade I, Ki-67 13%, ER+/ME+, Her2-; [id number], Stage I.       A multidisciplinary plan has been formulated for the patient:    (1) Breast Surgical Oncology:  -No indication for preoperative [employer] 9 panel genetic testing.   -Surgical plan: She believes she would like to proceed with left breast wire localized lumpectomy with sentinel lymph node biopsy, right breast wire localized excisional biopsy.  -Plastic surgery referral with Dr. Tran.     (2) Medical Oncology:  -Will refer postoperatively for evaluation for endocrine therapy and possible need for chemotherapy.     (3) Radiation Oncology:  -Will refer postoperatively for evaluation for radiation therapy.     (4) screening for colon cancer:  -Due for colonoscopy.  Will address postoperatively.     Referring Provider: No ref. provider found     Chief Complaint: abnormal breast imaging     History of Present Illness: Ms. Chante Harrell is a 54 y.o. year old lady, seen at the request of No ref. provider found for a new diagnosis of left breast cancer.       This was initially detected as an imaging abnormality.  This was her first mammogram since before COVID.  She had no prior breast biopsy.  Her work-up is detailed in the oncologic history below.      She denies any breast lumps, pain, skin changes, or nipple discharge. She denies any family history of breast or ovarian cancer.      Workup of Current Diagnosis:    6/11/2024 bilateral Screening Mammogram:  There are scattered areas of fibroglandular density.  There is an isodense asymmetry measuring 4 mm in the middle one third medial region of the left breast.  Requires additional evaluation.  BI-RADS Category 0     6/26/2024 left breast diagnostic Mammogram with Ultrasound:   There are scattered areas of fibroglandular density.  On  present exam there is an isodense asymmetry measuring 4 mm in the middle one third region of the left breast at 6:00 5 cm from the nipple.  Ultrasound demonstrates a round hypoechoic mass with partially defined margins measuring 4 mm in the middle one third region of the left breast at 6:00 5 cm from the nipple.  Suspicious.  Ultrasound-guided biopsy is recommended.  BI-RADS Category 4B     7/17/2024 left breast ultrasound-guided biopsy:   Left breast at 6:00 was imaged and the mass was localized.  13 cores were obtained.  A mini cork tissue marker was placed.  Clip was in the expected position.  Pathology returned as invasive ductal carcinoma and DCIS.     7/17/2024 Pathology:   Final Diagnosis   1. Left breast, 6:00, ultrasound-guided core needle biopsy (mini cork clip):               A. Invasive ductal carcinoma, well-differentiated; Nick histologic grade I/III        (tubule score = 2, nuclear score = 2, mitoses score = 1), measuring at least 3 mm.                B. 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               C. Negative for lymphovascular space invasion.   D. See biomarker template.      8/15/2024 Bilateral Breast MRI   IMPRESSION AND RECOMMENDATION:    1.  Non-mass enhancement measuring 2.2 cm at 6:00 in the left breast represents the site of biopsy-proven malignancy. Recommend surgical management.  2.  Suspicious 1 cm non-mass enhancement in the subareolar right breast with a suspected mammographic correlate. Recommend diagnostic mammogram, including spot compression views, and possible targeted ultrasound. This  should be followed with stereotactic core needle biopsy or ultrasound-guided core needle biopsy versus MRI guided core needle  biopsy if no confident mammographic or sonographic correlate is identified.  An epic inbox message sent to Dr. Rea on 8/15/2024.   BI-RADS Category 4: Suspicious    8/21/2024 Right Breast  Diagnostic Mammo/US:   CONCLUSION: Suspicious right-sided mammogram and directed right breast ultrasound showing a lesion in retroareolar right breast corresponding to the suspicious lesion noted on the breast MRI scan dated 08/21/2024. Further evaluation with ultrasound-guided core mammotome biopsy and clip placement is recommended. These findings have been discussed with the  patient. BI-RADS 4. Suspicious abnormality or suspicious finding.    9/4/2024 Right Breast US Guided Biopsy:   IMPRESSION:  1. Ultrasound-guided core needle biopsy of a mass at 1:00 in the retroareolar right breast corresponding to an area of suspicious  enhancement on recent breast MRI, marked with a bowtie clip. Pathology demonstrates intraductal papilloma, which is benign and concordant with the imaging assessment. Recommend surgical management.  2. Surgical management is again recommended for biopsy proven left breast malignancy.    9/4/2024 Pathology:   Final Diagnosis   1.  Breast, right retroareolar 1 o'clock position, core biopsy:  (bow clip)               A.  Fragments of nonintact sclerosed intraductal papilloma, measuring up to 4 mm maximally and involving 3 core fragments.      Gynecologic History:   G:3. P:2 AB:1  Age at first childbirth: 32  Lactation/How long: none  Age at menarche: 13  Age at menopause: unsure  Total years of oral contraceptive use: 26 years previously  Total years of hormone replacement therapy: none     Past Medical History:   HTN  OA     Past Surgical History:    [place] tooth extraction   Tonsillectomy/adenoidectomy  I&D perirectal abscess, Rectal fistulotomy  IUD     Family History:    As above     Social History:  Denies tobacco use  Occasional alcohol use     Allergies:   Allergies         Allergies   Allergen Reactions    Penicillins Anaphylaxis and Other (See Comments)       AS A KID    Adhesive Tape Rash       Skin irritation and blisters    Sulfa Antibiotics Rash and Other (See Comments)          Medications:     Current Medications      Current Outpatient Medications:     amLODIPine (NORVASC) 5 MG tablet, Take 1 tablet by mouth Daily., Disp: , Rfl:     ibuprofen (ADVIL,MOTRIN) 200 MG tablet, Take 1 tablet by mouth Every 6 (Six) Hours As Needed for Mild Pain., Disp: , Rfl:     lisinopril-hydrochlorothiazide (PRINZIDE,ZESTORETIC) 20-12.5 MG per tablet, take 1 tablet by mouth every day for 30 days, Disp: , Rfl:     metoprolol succinate XL (TOPROL-XL) 50 MG 24 hr tablet, Take 1 tablet by mouth Daily., Disp: , Rfl:     PARoxetine (PAXIL) 20 MG tablet, Take 1 tablet by mouth Every Morning., Disp: , Rfl:         Laboratory Values:    Labs from 2024 reviewed by me      Review of Systems:   Influenza-like illness: no fever, no  cough, no  sore throat, no  body aches, no loss of sense of taste or smell, no known exposure to person with Covid-19.  Constitutional: Negative for fevers or chills  HENT: Negative for hearing loss or runny nose  Eyes: Negative for vision changes or scleral icterus  Respiratory: Negative for cough or shortness of breath  Cardiovascular: Negative for chest pain or heart palpitations  Gastrointestinal: Negative for abdominal pain, nausea, vomiting, constipation, melena, or hematochezia  Genitourinary: Negative for hematuria or dysuria  Musculoskeletal: Negative for joint swelling or gait instability  Neurologic: Negative for tremors or seizures  Psychiatric: Negative for suicidal ideations or depression  All other systems reviewed and negative     Physical Exam:   ECO - Asymptomatic  Constitutional: Well-developed well-nourished, no acute distress  Eyes: Conjunctiva normal, sclera nonicteric  ENMT: Hearing grossly normal, oral mucosa moist  Neck: Supple, no palpable mass, trachea midline  Respiratory: Clear to auscultation, normal inspiratory effort  Cardiovascular: Regular rate, no peripheral edema, no jugular venous distention  Breast: symmetric  Right: No visible abnormalities on  inspection while seated, with arms raised or hands on hips. No masses, skin changes, or nipple abnormalities.  Left: No visible abnormalities on inspection while seated, with arms raised or hands on hips. No masses, skin changes, or nipple abnormalities.  No clinical chest wall involvement.  Gastrointestinal: Soft, nontender  Lymphatics (palpable nodes): No cervical, supraclavicular or axillary lymphadenopathy  Skin:  Warm, dry, no rash on visualized skin surfaces  Musculoskeletal: Symmetric strength, normal gait  Psychiatric: Alert and oriented ×3, normal affect      BMI is >= 30 and <35. (Class 1 Obesity). The following options were offered after discussion;: weight loss educational material (shared in after visit summary)     DEVORAH VARGAS M.D.  General and Endoscopic Surgery  [facility]     [address], [address]  [place], [place], [address]  P: [phone]  F: [phone]

## 2024-09-12 NOTE — ANESTHESIA POSTPROCEDURE EVALUATION
Patient: Chante Harrell    Procedure Summary       Date: 09/12/24 Room / Location:  [facility]  /  [facility]    Anesthesia Start: 1114 Anesthesia Stop: 1802    Procedures:       Right breast wire localized excisional biopsy, left breast wire localized lumpectomy with left sentinel lymph node biopsy (Bilateral: Breast)      BILATERAL VERTICAL MASTOPEXY (Bilateral: Breast) Diagnosis:       Malignant neoplasm of female breast, unspecified estrogen receptor status, unspecified laterality, unspecified site of breast      (Malignant neoplasm of female breast, unspecified estrogen receptor status, unspecified laterality, unspecified site of breast [C50.919])    Surgeons: Krystyna Rea MD; Marco Tran MD Provider: Shyla Ku MD    Anesthesia Type: general ASA Status: 3            Anesthesia Type: general    Vitals  Vitals Value Taken Time   /77 09/12/24 1900   Temp 37.1 °C (98.8 °F) 09/12/24 1900   Pulse 94 09/12/24 1903   Resp 18 09/12/24 1900   SpO2 93 % 09/12/24 1903   Vitals shown include unfiled device data.        Anesthesia Post Evaluation

## 2024-09-12 NOTE — ANESTHESIA PREPROCEDURE EVALUATION
Anesthesia Evaluation     Patient summary reviewed and Nursing notes reviewed   no history of anesthetic complications:   NPO Solid Status: > 8 hours  NPO Liquid Status: > 2 hours           Airway   Mallampati: III  TM distance: >3 FB  Neck ROM: full  Possible difficult intubation  Comment: Cervical stenosis with radiculopathy - left  Dental          Pulmonary - normal exam   (+) ,sleep apnea on CPAP  (-) COPD, asthma  Cardiovascular   Exercise tolerance: good (4-7 METS)    Rhythm: regular    (+) hypertension 2 medications or greater  (-) past MI, angina, cardiac stents      Neuro/Psych  (-) seizures, CVA  GI/Hepatic/Renal/Endo    (+) thyroid problem (goiter)   (-) GERD, liver disease, no renal disease    Musculoskeletal     (+) back pain (spinal stenosis)  Abdominal    Substance History - negative use     OB/GYN          Other   arthritis,   history of cancer active                  Anesthesia Plan    ASA 3     general     (Positive JESSE screen/DX:  2 or more mitigating factors include non-supine recovery position, reduced opiate use/multimodal pain control      I have reviewed the patient's history and chart with the patient, including all pertinent laboratory results and imaging. I have explained the risks of anesthesia including but not limited to dental or airway injury, nausea, cardio-pulmonary complications, such as aspiration, MI, stroke, or death.     VITALS:  /88 (BP Location: Left arm, Patient Position: Sitting)   Pulse 75   Temp 36.7 °C (98.1 °F) (Oral)   Resp 16   Wt 97.2 kg (214 lb 4.6 oz)   LMP  (LMP Unknown)   SpO2 96%   BMI 33.56 kg/m² )  intravenous induction     Anesthetic plan, risks, benefits, and alternatives have been provided, discussed and informed consent has been obtained with: patient.  Pre-procedure education provided      CODE STATUS:

## 2024-09-12 NOTE — OP NOTE
OPERATIVE REPORT     DATE: 9/12/2024     SURGEON: Krystyna Rea MD      OPERATION PERFORMED: left breast wire localized lumpectomy with sentinel lymph node biopsy, right breast wire localized excisional biopsy      PREOPERATIVE DIAGNOSIS: *** of the *** breast      POSTOPERATIVE DIAGNOSIS: Same     ANESTHESIA: ***     SPECIMEN:   ***  breast wire localized lumpectomy (short stitch superior, long lateral, double anterior)  Additional anterior margin (stitch marks true margin)  Additional posterior margin (stitch marks true margin)  Additional medial margin (stitch marks true margin)  Additional lateral margin (stitch marks true margin)  Additional superior margin (stitch marks true margin)  Additional inferior margin (stitch marks true margin)    Left axillary sentinel lymph node #1   Left axillary sentinel lymph node #2   Left axillary sentinel lymph node #3     DRAINS: None     BLOOD LOSS: Minimal     INDICATION FOR OPERATION:Chante Harrell is a 54 y.o. lady who was recently diagnosed with ***.  She ultimately elected to proceed with ***breast wire localized lumpectomy with sentinel lymph node biopsy. All risks (including bleeding, infection, damage to surrounding structures, need for further surgery, pathologic upgrade), benefits and alternatives were explained to the patient who agreed and wished to proceed. Informed consent was signed.      OPERATIVE COURSE: The patient was taken to the operating room, transferred onto the operating room table, and underwent anesthesia without incident. 5 cc of blue dye was injected into the dermal layer of the *** nipple areolar complex. The patient was prepped and draped in sterile fashion. A time out was performed and preoperative antibiotics were given.  Half percent Marcaine with epinephrine was injected into the skin and subcutaneous tissues.  A curvilinear incision was made along the breast from the ***.  Bovie electrocautery was used to create a flap along the length  of the wire 1 cm in thickness.  *** Lastly the wire was brought through the incision with 2 hemostats.   The tissue was amputated at its base.  It was marked as listed above.  Specimen radiograph confirmed clip, wire, and abnormal breast tissue.  It was sent for fresh permanent specimen. Additional margins were taken along ***borders. These were done with Allis clamps and approximately 1 cm in thickness. The area was irrigated and appeared hemostatic.  There were no signs of bleeding.      We then performed a sentinel lymph node biopsy. A curvilinear incision was made just inferior to the hairbearing area of the *** axilla. Bovie electrocautery was used to dissect down through the skin and subcutaneous tissues and through the clavipectoral fascia into the axilla. ***[place] lymph nodes were identified. These were removed with Bovie electrocautery and clips across all major lymphovascular structures. Once this was done, there were no hot, blue, or palpable lymph nodes remaining. The area was irrigated and appeared hemostatic.    The rest of the local anesthetic was administered.  The incisions were closed with interrupted 3-0 Vicryl sutures and a running 4-0 Monocryl suture.  Skin glue was placed over the incision.  The patient tolerated the procedure well. All needle and lap counts were correct at the end of the case. The patient was then awoken from anesthesia and taken to recovery for further monitoring.     [place] Node Biopsy for Breast Cancer - Left  Operation performed with curative intent. Yes   Tracer(s) used to identify sentinel nodes in the upfront surgery (non-neoadjuvant) setting (select all that apply). Dye and Radioactive tracer   Tracer(s) used to identify sentinel nodes in the neoadjuvant setting (select all that apply). N/A   All nodes (colored or non-colored) present at the end of a dye-filled lymphatic channel were removed. Yes   All significantly radioactive nodes were removed. Yes   All  palpably suspicious nodes were removed. Yes   Biopsy-proven positive nodes marked with clips prior to chemotherapy were identified and removed. N/A      Krystyna Rea MD   General and Endoscopic Surgery  [facility]     [address], [address]  [place], [place], [address]  P: [phone]  F: [phone]

## 2024-09-13 ENCOUNTER — ANCILLARY PROCEDURE (OUTPATIENT)
Dept: LAB | Facility: HOSPITAL | Age: 54
End: 2024-09-13
Payer: COMMERCIAL

## 2024-09-13 ENCOUNTER — TRANSCRIBE ORDERS (OUTPATIENT)
Dept: LAB | Facility: HOSPITAL | Age: 54
End: 2024-09-13
Payer: COMMERCIAL

## 2024-09-13 DIAGNOSIS — N63.10 BILATERAL BREAST LUMP: ICD-10-CM

## 2024-09-13 DIAGNOSIS — N63.20 BILATERAL BREAST LUMP: Primary | ICD-10-CM

## 2024-09-13 DIAGNOSIS — N63.20 BILATERAL BREAST LUMP: ICD-10-CM

## 2024-09-13 DIAGNOSIS — N63.10 BILATERAL BREAST LUMP: Primary | ICD-10-CM

## 2024-09-13 PROCEDURE — 76098 X-RAY EXAM SURGICAL SPECIMEN: CPT

## 2024-09-17 NOTE — OP NOTE
Operative Report    Patient Name: Chante Harrell   Date of surgery: 9/12/2024    Pre-op Diagnosis:  Left breast cancer   Post-Op Diagnosis Codes: Left breast cancer     Procedure(s): BILATERAL breast reconstruction with oncoplastic reconstruction with vertical mastopexy    Surgeon(s): Marco Tran MD       Anesthesia: General & local with exparel (20cc exparel, 20cc marcaine plain) 40cc total for case    Indications for procedure: Chante Harrell is a 54 y.o. female here today for nreast reconstruction following a left lumpectomy and right wire localized biopsy.  The risks, benefits, and alternatives of the procedure were discussed in detail with the patient including but not limited to pain, bleeding, infection, damage to surrounding structures, potential need for future surgery, scarring, asymmetry, poor wound healing. We discussed the best option for reconstruction would be a vertical mastopexy on the left due to the location of her anticipated lumpectomy defect and likely a similar procedure on the right. She understands the risks and elects to proceed with procedure.    Procedure narrative:   The patient was met and marked in the upright and standing position in the preoperative holding area.  The risks were again discussed with the patient and all questions were answered.      She was taken to the operating room and laid supine on well-padded operating room table with the arm slightly abducted less than 90 degrees.     The patient's chest was then prepped with ChloraPrep and draped in the usual sterile fashion. She underwent left lumpectomy and right biopsy by Dr. Rea then I assumed care of the patient.     I began on the left breast.  After assessing the location of the defect the decision was made to proceed with vertical mastopexy with planned lollipop incisions. A 42 mm cookie cutter was used to incise the NAC. I marked out the anticipated skin resection in lower pole. And de-epithelialized the  keyhole. The breast was irrigated and hemostasis confirmed. I closed the vertical pillars with 2-0 vicryl in the parenchyma, followed by closure of dermis inferiorly. I then say the patient up and tailor tacked the location of the new nipple. The incisions were made, skin incised and the NAC was moved cephalad. Minimal undermining performed keeping with a central mound pedicle. I then closed with layers with 3-0 monocryl deep dermis, 4-0 monocryl running subcuticular around the NAC and vertical incision. The NAC had a final location nearly 7cm from IMF but no standing cone deformity noted, so IMF incision was not needed.     I then turned my attention to the right breast and the defect was found to be retroareolar, so decision made to use a superomedial pedicle. A similar procedure was performed on the right side and the NAC was carefully rotated into position with little to no tension. Patient was sat up to assess size and symmetry which was deemed to be adequate. Breast was irrigated and closed in layers as per the other side.     Left axillary incision was closed in layers as well with 3-0 monocryl and 4-0 monocryl.     The patient's chest was then cleansed and dried. Exofin fusion was placed over the incisions.  Fluffed Kerlix and Ace was applied to the patient's chest.    There were no complications all surgical counts were correct at the conclusion of the case.  NAC both pink and viable at conclusion of case. Patient tolerated the procedure well and she was taken to the recovery room for planned discharge home.     Estimated Blood Loss: minimal  Specimens:    skin from each breast  Drains: none  Findings: Good symmetry at conclusion of case  Complications: none      Marco Tran MD, BSN, RN  Plastic Surgeon

## 2024-09-20 ENCOUNTER — TELEPHONE (OUTPATIENT)
Dept: SURGERY | Facility: CLINIC | Age: 54
End: 2024-09-20
Payer: COMMERCIAL

## 2024-09-20 DIAGNOSIS — C50.919 MALIGNANT NEOPLASM OF FEMALE BREAST, UNSPECIFIED ESTROGEN RECEPTOR STATUS, UNSPECIFIED LATERALITY, UNSPECIFIED SITE OF BREAST: Primary | ICD-10-CM

## 2024-09-20 LAB
CYTO UR: NORMAL
LAB AP CASE REPORT: NORMAL
LAB AP CLINICAL INFORMATION: NORMAL
LAB AP DIAGNOSIS COMMENT: NORMAL
LAB AP SPECIAL STAINS: NORMAL
LAB AP SYNOPTIC CHECKLIST: NORMAL
PATH REPORT.ADDENDUM SPEC: NORMAL
PATH REPORT.FINAL DX SPEC: NORMAL
PATH REPORT.GROSS SPEC: NORMAL

## 2024-09-23 ENCOUNTER — OFFICE VISIT (OUTPATIENT)
Dept: SURGERY | Facility: CLINIC | Age: 54
End: 2024-09-23
Payer: COMMERCIAL

## 2024-09-23 VITALS — BODY MASS INDEX: 33.74 KG/M2 | WEIGHT: 215 LBS | HEIGHT: 67 IN

## 2024-09-23 DIAGNOSIS — C50.919 MALIGNANT NEOPLASM OF FEMALE BREAST, UNSPECIFIED ESTROGEN RECEPTOR STATUS, UNSPECIFIED LATERALITY, UNSPECIFIED SITE OF BREAST: Primary | ICD-10-CM

## 2024-09-23 DIAGNOSIS — N64.89 SEROMA OF BREAST: ICD-10-CM

## 2024-09-23 PROCEDURE — 99024 POSTOP FOLLOW-UP VISIT: CPT | Performed by: STUDENT IN AN ORGANIZED HEALTH CARE EDUCATION/TRAINING PROGRAM

## 2024-10-07 ENCOUNTER — CONSULT (OUTPATIENT)
Dept: RADIATION ONCOLOGY | Facility: HOSPITAL | Age: 54
End: 2024-10-07
Payer: COMMERCIAL

## 2024-10-07 VITALS
DIASTOLIC BLOOD PRESSURE: 77 MMHG | HEART RATE: 71 BPM | BODY MASS INDEX: 33.99 KG/M2 | WEIGHT: 217 LBS | OXYGEN SATURATION: 97 % | SYSTOLIC BLOOD PRESSURE: 113 MMHG

## 2024-10-07 DIAGNOSIS — C50.912 NEOPLASM OF LEFT BREAST, REGIONAL LYMPH NODE STAGING CATEGORY PN0 PER AMERICAN JOINT COMMITTEE ON CANCER STAGING GUIDELINES, 7TH EDITION: Primary | ICD-10-CM

## 2024-10-07 PROCEDURE — G0463 HOSPITAL OUTPT CLINIC VISIT: HCPCS | Performed by: RADIOLOGY

## 2024-10-07 NOTE — PROGRESS NOTES
CC: left breast cancer pT1aN0 ER ND pos Her  2neg                                  Dear Krystyna Rea MD    I had the pleasure of seeing Chante Harrell  [facility].   The patient is a 54 y.o. female with recently diagnosed left breast cancer.  She had a screening mammogram on 6/11/24 which showed an isodense asymmetry measuring 4 mm in the middle one third medial region of the left breast.  Requires additional evaluation.BI-RADS Category 0.  She had a diagnostic left mammogram on 6/26/24 which showed    an isodense asymmetry measuring 4 mm in the middle one third region of the left breast at 6:00 5 cm from the nipple.  Ultrasound demonstrates a round hypoechoic mass with partially defined margins measuring 4 mm in the middle one third region of the left breast at 6:00 5 cm from the nipple.  Suspicious.  Ultrasound-guided biopsy is recommended.BI-RADS Category 4B.      She had an us guided biopsy of the left breast on 7/17/24 which showed invasive ductal carcinoma, well-differentiated; Nick histologic grade I/III 3 mm intermediate grade ductal carcinoma in situ (DCIS), solid and cribriform types with single cell necrosis.Negative for lymphovascular space invasion. ER % ND % Her 2 neg Ki67 13%.      She had a breast mri on 8/15/24 which showed Non-mass enhancement measuring 2.2 cm at 6:00 in the left breastrepresents the site of biopsy-proven malignancy. Recommend surgicalmanagement.2.  Suspicious 1 cm non-mass enhancement in the subareolar right breastwith a suspected mammographic correlate. Recommend diagnostic mammogram,including spot compression views, and possible targeted ultrasound. Thisshould be followed with stereotactic core needle biopsy orultrasound-guided core needle biopsy versus MRI guided core needlebiopsy if no confident mammographic or sonographic correlate is identified.     She had a mri guide biopsy of the right breast on 9/4/24 which showed intraductal  papilloma.      She underwent a left breast lumpectomy on 24 with pathology revealing a 3mm grade II invasive ductal carcinoma, no lvsi with associated dcis 2.5mm solid and cribriform type with central necrosis. The invasive component was 18mm from the medial margin and dcis was 16 mm from the medial margin.   Four sentinel nodes were negative, pT1aN0.  Excisional biopsy of the right breast was negative.     She is recovering well from her surgery and referred today for evaluation for adjuvant radiation.     She is  with menarche age 13.  She is unsure of age at menopause.  She has never used hormone replacement therapy.  She has no family hx of breast cancer.      Review of Systems   Constitutional: Negative.    HENT: Negative.     Respiratory: Negative.     Cardiovascular: Negative.    Gastrointestinal: Negative.    Genitourinary: Negative.    Musculoskeletal: Negative.    Neurological: Negative.    Hematological: Negative.    Psychiatric/Behavioral: Negative.           Past Medical History:   Diagnosis Date    Allergic rhinitis     Anxiety     TX'D WITH LEXAPRO    Chronic fatigue     Depression     Goiter 2016    Group B streptococcal infection during pregnancy     Herniated disc, cervical     History of COVID-19     HPV (human papilloma virus) infection 06/10/2024    Hypertension     Malignant neoplasm of breast     Migraines     OA (osteoarthritis)     Onychomycosis     JESSE on CPAP     Perirectal abscess 2018    cont to drain and will have 2nd surgery 18    Post partum depression     Rectal fistula 2018    continues to drain and will now have 2nd surgery for it 18    SAB (spontaneous ) 2001     A1, MISSED  AT 5 WEEKS    Spinal stenosis          Past Surgical History:   Procedure Laterality Date    BREAST BIOPSY Left     BREAST BIOPSY Right 2024    BREAST LUMPECTOMY WITH SENTINEL NODE BIOPSY Bilateral 2024    Procedure: Right breast  wire localized excisional biopsy, left breast wire localized lumpectomy with left sentinel lymph node biopsy;  Surgeon: Krystyna Rea MD;  Location:  [facility];  Service: General;  Laterality: Bilateral;    BREAST RECONSTRUCTION Bilateral 9/12/2024    Procedure: BILATERAL VERTICAL MASTOPEXY;  Surgeon: Marco Tarn MD;  Location:  [facility];  Service: Plastics;  Laterality: Bilateral;    COLONOSCOPY N/A 01/2010    WNL PER PT    COLPOSCOPY  07/08/2024    CYST REMOVAL N/A     ON ELBOW AND FACE    FRACTURE SURGERY N/A 1974    BROKEN COLLAR BONE    INCISION AND DRAINAGE PERIRECTAL ABSCESS N/A 07/27/2018    Procedure: INCISION AND DRAINAGE OF PERIRECTAL ABSCESS;  Surgeon: Stacie Fitzgerald MD;  Location: [facility] OR;  Service: General    INTRAUTERINE DEVICE INSERTION N/A 04/17/2018    MIRENA, EXPIRES 11/17/2019, DR. MIGUELITO ZELAYA    RECTAL FISTULOTOMY N/A 06/18/2018    Procedure: Incision and Drainage of Perirectal Abscess with drain placement;  Surgeon: Stacie Fitzgerald MD;  Location: [facility] MAIN OR;  Service: General    RECTAL FISTULOTOMY N/A 07/27/2018    Procedure: RECTAL FISTULOTOMY;  Surgeon: Stacie Fitzgerald MD;  Location: [facility] OR;  Service: General    TONSILLECTOMY AND ADENOIDECTOMY Bilateral     DURING CHILDHOOD    WISDOM TOOTH EXTRACTION Bilateral 1986         Social History     Socioeconomic History    Marital status: Single   Tobacco Use    Smoking status: Never    Smokeless tobacco: Never   Vaping Use    Vaping status: Never Used   Substance and Sexual Activity    Alcohol use: Yes     Alcohol/week: 4.0 standard drinks of alcohol     Types: 4 Glasses of wine per week    Drug use: No     Comment: 5-6 DRINKS A WEEK ON AVERAGE    Sexual activity: Not Currently     Partners: Male     Birth control/protection: I.U.D.     Comment: Mirena 4-17-18         Family History   Problem Relation Age of Onset    Heart disease Mother     Hypertension Mother     Skin cancer Mother     Cancer Father      Diabetes Father     Heart disease Father     Hypertension Father     Kidney disease Father     Prostate cancer Father     [place]'s disease Sister     Diabetes Sister     Hypertension Sister     Thyroid disease Sister     No Known Problems Son     No Known Problems Son     Cervical cancer Maternal Aunt     Stroke Maternal Grandmother     Lung cancer Maternal Grandmother     Cervical cancer Maternal Grandmother     Stroke Maternal Grandfather     Stroke Paternal Grandmother     Skin cancer Paternal Grandmother     Diabetes Paternal Grandfather     Spina bifida Other     Miscarriages / Stillbirths Other     Malig Hyperthermia Neg Hx     Breast cancer Neg Hx     Colon cancer Neg Hx           Objective    Physical Exam  Constitutional:       Appearance: Normal appearance.   HENT:      Head: Atraumatic.   Eyes:      Extraocular Movements: Extraocular movements intact.   Pulmonary:      Effort: Pulmonary effort is normal.   Chest:          Comments: Incisions healing well bilaterally, no palpable masses in either breast  Musculoskeletal:         General: Normal range of motion.   Neurological:      General: No focal deficit present.      Mental Status: She is alert.   Psychiatric:         Mood and Affect: Mood normal.         Behavior: Behavior normal.         Current Outpatient Medications on File Prior to Visit   Medication Sig Dispense Refill    amLODIPine (NORVASC) 5 MG tablet Take 1 tablet by mouth Daily.      cetirizine (zyrTEC) 10 MG tablet Take 1 tablet by mouth Daily.      lisinopril-hydrochlorothiazide (PRINZIDE,ZESTORETIC) 20-12.5 MG per tablet Take 1 tablet by mouth Daily.      metoprolol succinate XL (TOPROL-XL) 50 MG 24 hr tablet Take 1 tablet by mouth Daily.      PARoxetine (PAXIL) 20 MG tablet Take 1 tablet by mouth Every Morning.       No current facility-administered medications on file prior to visit.       ALLERGIES:    Allergies   Allergen Reactions    Penicillins Anaphylaxis and Other (See Comments)      Beta lactam allergy details  Antibiotic reaction: other (SWELLING)  Age at reaction: child  Dose to reaction time: unknown  Reason for antibiotic: unknown  Epinephrine required for reaction?: unknown  Tolerated antibiotics: unknown       Adhesive Tape Rash     Skin irritation and blisters    Sulfa Antibiotics Rash       LMP  (LMP Unknown)           No data to display                  Assessment & Plan     53 yo female with pT1aN0 left breast cancer, ER [place] Positive Her 2 negative s/p lumpectomy and sln biopsy.  I discussed with her my recommendation for post-operative radiation therapy to the left breast to decrease the risk of local recurrence.      I discussed with her in detail the risks, benefits and rationale of radiation therapy to the left breast to include but not limited to the following:    Acute: skin erythema, breakdown/moist desquamation, swelling or discomfort of the breast, fatigue, pneumonitis resulting in shortness of breath, cough or pain    Late: Permanent skin changes including hyperpigmentation, telangiectasias, fibrosis of the breast resulting in smaller size or poor cosmetic outcome, late edema or cellulitis, late rib fracture, late pulmonary fibrosis and the remote risk of late cardiac damage resulting in increased risk of heart attack or second malignancies.      I discussed both whole breast radiation and accelerated partial breast irradiation and the differences between each approach. She voiced understanding and was given an opportunity to ask questions which I believe were answered to her satisfaction.  She is not sure if she wants to proceed with any radiation at all given the small size of her tumor.  She would like to think about it over the next few weeks.  She states she will call us back if she decides she wants to proceed.  She has a consultation with medical oncology next week.     I personally spent greater than 60 minutes today assessing, managing, discussing and documenting  my visit with the patient. That time includes review of records, imaging and pathology reports, obtaining my own history, performing a medically appropriate evaluation, counseling and educating the patient, discussing goals, logistics, alternatives and risks of my recommendations, surveillance options and potential outcomes. It also includes the time documenting the clinical information in the EMR and communicating my recommendations to the other involved physicians.            Thank you very much for allowing me to participate in the care of this very pleasant patient.    Sincerely,      Nanette Hernandez MD

## 2024-10-08 ENCOUNTER — PATIENT OUTREACH (OUTPATIENT)
Dept: OTHER | Facility: HOSPITAL | Age: 54
End: 2024-10-08
Payer: COMMERCIAL

## 2024-10-08 NOTE — PROGRESS NOTES
Reviewed chart. Patient with left breast invasive ductal carcinoma: Grade I, Ki-67 13%, ER+/FL+, Her2-; [id number], Stage I. S/p left breast wire localized lumpectomy with sentinel lymph node biopsy by Dr. Rea, right breast wire localized excisional biopsy with oncoplastic closure by Dr. Tran 9/12/2024. Met with Dr. Hernandez yesterday-patient deciding if she wants radiation. Meets with Dr. Baker 10/16    Called patient. Left message that I work with Jac[facility] breast navigator. I was just checking in to see how she is doing post surgery and if she has any ongoing needs. Requested call back. Left my number and Jacque's

## 2024-10-15 NOTE — PROGRESS NOTES
Subjective     REASON FOR CONSULTATION:  pT1aN0 grade 2 invasive ductal carcinoma left breast ER/RI positive HER2 negative postlumpectomy and sentinel node biopsy  Provide an opinion on any further workup or treatment                             REQUESTING PHYSICIAN: MD Julio Rose MD John Markert, MD        RECORDS OBTAINED:  Records of the patients history including those obtained from the referring provider were reviewed and summarized in detail.    HISTORY OF PRESENT ILLNESS:  The patient is a 54 y.o. year old female who is here for an opinion about the above issue.    History of Present Illness patient is a 54-year-old perimenopausal female with a screen detected abnormality in the left breast That led to diagnostic imaging and ultrasound that showed a 4 mm abnormality in the breast.  This was biopsied and showed a invasive ductal carcinoma grade 1 measuring 3 mm strongly ER/RI positive HER2 negative with a Ki-67 of 13% patient was referred to Dr. Rea underwent bilateral breast MRI with some abnormality detected in the right breast that was biopsied and showed a papilloma    Patient was taken to surgery and had lumpectomy with sentinel node biopsy on the left and a excisional biopsy and left on the right with the final path showing a residual 3 mm cancer in the breast with associated DCIS measuring 2.5 mm and clear margins and 4 negative sentinel nodes.  Postoperatively she has done well and is here to discuss further treatment options    She is  3 para 2 with 1 miscarriage menarche was age 13 at age 43 she had an IUD placed and has not had.  For the last 10 years her IUD was removed in  of this year-  First childbirth was at age 31 she did not breast-feed    Family history is positive for father with prostate cancer in his 70s maternal grandmother with lung cancer from smoking    She has  never had a DVT MI or stroke  She has neck pain from herniated disks and obstructive sleep apnea on CPAP machine    She has hypertension and depression for which she has been on Paxil for 20 years    Past Medical History:   Diagnosis Date    Allergic rhinitis     Anxiety     TX'D WITH LEXAPRO    Chronic fatigue     Depression     Goiter 2016    Group B streptococcal infection during pregnancy     Herniated disc, cervical     History of COVID-19     HPV (human papilloma virus) infection 06/10/2024    Hypertension     Malignant neoplasm of breast     Migraines     OA (osteoarthritis)     Onychomycosis     JESSE on CPAP     Perirectal abscess 2018    cont to drain and will have 2nd surgery 18    Post partum depression     Rectal fistula 2018    continues to drain and will now have 2nd surgery for it 18    SAB (spontaneous ) 2001     A1, MISSED  AT 5 WEEKS    Spinal stenosis         Past Surgical History:   Procedure Laterality Date    BREAST BIOPSY Left     BREAST BIOPSY Right 2024    BREAST LUMPECTOMY WITH SENTINEL NODE BIOPSY Bilateral 2024    Procedure: Right breast wire localized excisional biopsy, left breast wire localized lumpectomy with left sentinel lymph node biopsy;  Surgeon: Krystyna Rea MD;  Location: [facility] OR [facility];  Service: General;  Laterality: Bilateral;    BREAST RECONSTRUCTION Bilateral 2024    Procedure: BILATERAL VERTICAL MASTOPEXY;  Surgeon: Marco Tran MD;  Location: [facility] OR [facility];  Service: Plastics;  Laterality: Bilateral;    COLONOSCOPY N/A 2010    WNL PER PT    COLPOSCOPY  2024    CYST REMOVAL N/A     ON ELBOW AND FACE    FRACTURE SURGERY N/A     BROKEN COLLAR BONE    INCISION AND DRAINAGE PERIRECTAL ABSCESS N/A 2018    Procedure: INCISION AND DRAINAGE OF PERIRECTAL ABSCESS;  Surgeon: Stacie Fitzgerald MD;  Location: [facility] OR;  Service: General    INTRAUTERINE DEVICE INSERTION N/A  04/17/2018    NOLAN, EXPIRES 11/17/2019, DR. MIGUELITO ZELAYA    RECTAL FISTULOTOMY N/A 06/18/2018    Procedure: Incision and Drainage of Perirectal Abscess with drain placement;  Surgeon: Stacie Fitzgerald MD;  Location: [facility] OR;  Service: General    RECTAL FISTULOTOMY N/A 07/27/2018    Procedure: RECTAL FISTULOTOMY;  Surgeon: Stacie Fitzgerald MD;  Location: [facility];  Service: General    TONSILLECTOMY AND ADENOIDECTOMY Bilateral     DURING CHILDHOOD    WISDOM TOOTH EXTRACTION Bilateral 1986        Current Outpatient Medications on File Prior to Visit   Medication Sig Dispense Refill    amLODIPine (NORVASC) 5 MG tablet Take 1 tablet by mouth Daily.      cetirizine (zyrTEC) 10 MG tablet Take 1 tablet by mouth Daily.      lisinopril-hydrochlorothiazide (PRINZIDE,ZESTORETIC) 20-12.5 MG per tablet Take 1 tablet by mouth Daily.      metoprolol succinate XL (TOPROL-XL) 50 MG 24 hr tablet Take 1 tablet by mouth Daily.      PARoxetine (PAXIL) 20 MG tablet Take 1 tablet by mouth Every Morning.       No current facility-administered medications on file prior to visit.        ALLERGIES:    Allergies   Allergen Reactions    Penicillins Anaphylaxis and Other (See Comments)     Beta lactam allergy details  Antibiotic reaction: other (SWELLING)  Age at reaction: child  Dose to reaction time: unknown  Reason for antibiotic: unknown  Epinephrine required for reaction?: unknown  Tolerated antibiotics: unknown       Adhesive Tape Rash     Skin irritation and blisters    Sulfa Antibiotics Rash        Social History     Socioeconomic History    Marital status: Single   Tobacco Use    Smoking status: Never    Smokeless tobacco: Never   Vaping Use    Vaping status: Never Used   Substance and Sexual Activity    Alcohol use: Yes     Alcohol/week: 4.0 standard drinks of alcohol     Types: 4 Glasses of wine per week    Drug use: No     Comment: 5-6 DRINKS A WEEK ON AVERAGE    Sexual activity: Not Currently     Partners: Male      "Birth control/protection: I.U.D.     Comment: Mirena 4-17-18        Family History   Problem Relation Age of Onset    Heart disease Mother     Hypertension Mother     Skin cancer Mother     Cancer Father     Diabetes Father     Heart disease Father     Hypertension Father     Kidney disease Father     Prostate cancer Father     [place]'s disease Sister     Diabetes Sister     Hypertension Sister     Thyroid disease Sister     No Known Problems Son     No Known Problems Son     Cervical cancer Maternal Aunt     Stroke Maternal Grandmother     Lung cancer Maternal Grandmother     Cervical cancer Maternal Grandmother     Stroke Maternal Grandfather     Stroke Paternal Grandmother     Skin cancer Paternal Grandmother     Diabetes Paternal Grandfather     Spina bifida Other     Miscarriages / Stillbirths Other     Malig Hyperthermia Neg Hx     Breast cancer Neg Hx     Colon cancer Neg Hx         Review of Systems   Musculoskeletal:  Positive for arthralgias (Neck pain and back pain).        Objective     Vitals:    10/16/24 1347   BP: 120/80   Pulse: 65   Temp: 98 °F (36.7 °C)   TempSrc: Oral   SpO2: 96%   Weight: 97.3 kg (214 lb 8 oz)   Height: 170.2 cm (67.01\")   PainSc: 0-No pain         10/16/2024     1:48 PM   Current Status   ECOG score 0       Physical Exam   CONSTITUTIONAL:  Vital signs reviewed.  No distress, looks comfortable.  EYES:  Conjunctivae and lids unremarkable.  PERRLA  EARS,NOSE,MOUTH,THROAT:  Ears and nose appear unremarkable.  Lips, teeth, gums appear unremarkable.  RESPIRATORY:  Normal respiratory effort.  Lungs clear to auscultation bilaterally.  BREASTS: Both breasts show scars of the left procedure left axillary scar shows a residual seroma measuring 4 x 4 cm  CARDIOVASCULAR:  Normal S1, S2.  No murmurs rubs or gallops.  No significant lower extremity edema.  GASTROINTESTINAL: Abdomen appears unremarkable.  Nontender.  No hepatomegaly.  No splenomegaly.  LYMPHATIC:  No cervical, supraclavicular, " axillary lymphadenopathy.  SKIN:  Warm.  No rashes.  PSYCHIATRIC:  Normal judgment and insight.  Normal mood and affect.      RECENT LABS:  Hematology WBC   Date Value Ref Range Status   10/16/2024 7.06 3.40 - 10.80 10*3/mm3 Final     RBC   Date Value Ref Range Status   10/16/2024 4.15 3.77 - 5.28 10*6/mm3 Final     Hemoglobin   Date Value Ref Range Status   10/16/2024 13.6 12.0 - 15.9 g/dL Final     Hematocrit   Date Value Ref Range Status   10/16/2024 39.2 34.0 - 46.6 % Final     Platelets   Date Value Ref Range Status   10/16/2024 222 140 - 450 10*3/mm3 Final        Final Diagnosis 7/17/24  1. Left breast, 6:00, ultrasound-guided core needle biopsy (mini cork clip):  A. Invasive ductal carcinoma, well-differentiated; [place] histologic grade I/III  (tubule score = 2, nuclear score = 2, mitoses score = 1), measuring at least 3 mm.  B. 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  C. Negative for lymphovascular space invasion.  D. See biomarker template.  Electronically signed by Maria E Rosa MD on 7/23/2024 at 0613  .  Synoptic Checklist 7/17/24  Breast Biomarker Reporting Template (BREAST BIOMARKER REPORTING TEMPLATE - All Specimens) Protocol posted: 12/13/2023  Test(s) Performed  Estrogen Receptor (ER) Status Positive (greater than 10% of cells demonstrate nuclear positivity)  Percentage of Cells with Nuclear Positivity %  Average Intensity of Staining Strong  Test Type Food and Drug Administration (FDA) cleared (test / vendor): [place]  Primary Antibody SP1  Scoring System [place]  Proportion Score 5  Intensity Score 3  Total [place] Score 8  Test(s) Performed  Progesterone Receptor (PgR) Status Positive  Percentage of Cells with Nuclear Positivity %  Average Intensity of Staining Strong  Test Type Food and Drug Administration (FDA) cleared (test / vendor): [place]m  Primary Antibody 1E2  Scoring System [place]  Proportion  Score  Intensity Score 3  Total [place] Score 8  Test(s) Performed  HER2 by Immunohistochemistry Negative (Score 0)  Test Type Food and Drug Administration (FDA) cleared (test / vendor): [place]  Primary Antibody 4B5  Test(s) Performed Ki-67  Ki-67 Percentage of Positive Nuclei 13 %    Final Diagnosis 9/4/24  1. Breast, right retroareolar 1 o'clock position, core biopsy: (bow clip)  A. Fragments of nonintact sclerosed intraductal papilloma, measuring up to 4 mm maximally and involving 3 core  fragments.    Synoptic Checklist 9/12/24  INVASIVE CARCINOMA OF THE BREAST: Resection (INVASIVE CARCINOMA OF THE BREAST: RESECTION - 6, 7, 8, 9, 10, 11, 12, 13, 14,  15) 8th Edition - Protocol posted: 6/19/2024  SPECIMEN  Procedure Excision (less than total mastectomy)  Specimen Laterality Left  .  TUMOR  Tumor Site Lower outer quadrant  Lower inner quadrant  Histologic Type Invasive carcinoma of no special type (ductal)  Histologic Grade ([place] Histologic Score)  Glandular (Acinar) / Tubular Differentiation Score 3  Nuclear Pleomorphism Score 3  Mitotic Rate Score 1  Overall Grade Grade 2 (scores of 6 or 7)  Tumor Size Greatest dimension of largest invasive focus (Millimeters): 3 mm  Ductal Carcinoma In Situ (DCIS) Present  Size (Extent) of DCIS Estimated size (extent) of DCIS is at least (Millimeters): 2.5 mm  Architectural Patterns Cribriform  Solid  Nuclear Grade Grade II (intermediate)  Necrosis Present, focal (small foci or single cell necrosis)  Lobular Carcinoma In Situ (LCIS) Not identified  Lymphatic and / or Vascular Invasion Not identified  Dermal Lymphatic and / or Vascular Invasion No skin present  Microcalcifications Not identified  Treatment Effect in the Breast No known presurgical therapy  .  MARGINS  Margin Status for Invasive Carcinoma All margins negative for invasive carcinoma  Distance from Invasive Carcinoma to Closest Margin 18 mm  Closest Margin(s) to Invasive Carcinoma Medial  Distance from  Invasive Carcinoma to Anterior Margin 29 mm  Distance from Invasive Carcinoma to Posterior Margin 17 mm  Distance from Invasive Carcinoma to Superior Margin 31 mm  Distance from Invasive Carcinoma to Inferior Margin 37 mm  Distance from Invasive Carcinoma to Medial Margin 18 mm  Distance from Invasive Carcinoma to Lateral Margin 24.5 mm  Margin Status for DCIS All margins negative for DCIS  Distance from DCIS to Closest Margin 16.8 mm  Closest Margin(s) to DCIS Medial  Distance from DCIS to Anterior Margin 31.5 mm  Distance from DCIS to Posterior Margin 16 mm  Distance from DCIS to Superior Margin 19 mm  Distance from DCIS to Inferior Margin 30 mm  Distance from DCIS to Medial Margin 16.8 mm  Distance from DCIS to Lateral Margin  REGIONAL LYMPH NODES  Regional Lymph Node Status All regional lymph nodes negative for tumor  Total Number of Lymph Nodes Examined (sentinel and non-sentinel) Cannot be determined: 4  Number of [place] Nodes Examined 4  .  pT Category pT1a  pN Category pN0  N Suffix (sn)  .  SPECIAL STUDIES  Estrogen Receptor (ER) Status Positive (greater than 10% of cells demonstrate nuclear positivity)  Percentage of Cells with Nuclear Positivity %  Progesterone Receptor (PgR) Status Positive  Percentage of Cells with Nuclear Positivity %  HER2 (by immunohistochemistry) Negative (Score 0)  Ki-67 Percentage of Positive Nuclei 13 %    Assessment & Plan     1.pT1aN0 grade 2 IDC left breast ER/AR+ Her2 neg post lumpectomy and SLNB clear margins -patient deciding on radiation and prevention with hormone blockade    2. Family history-positive for prostate cancer in her father genetic testing ordered    3.  Menstrual status unclear we will check FSH LH and estradiol    4 hypertension on treatment.    5.  Depression on Paxil for 20 years    Plan  Discussed the pros and cons of prevention with tamoxifen if she is premenopausal or postmenopausal and/or use of AI depending on bone density status.  She  will not be able to take tamoxifen if she cannot get off her Paxil so this is an issue we will have to deal with once we know what her hormonal status is.    She has never had a bone density and I think it would be good to know baseline before we start any prevention and she is rethinking whether or not to do radiation because it is quick and over in 3 weeks and may be a better way of reducing risk in the ipsilateral breast and taking 5 years of hormonal blockade especially if she has to switch from Paxil to another antidepressant to take tamoxifen    We will check a [facility] genetic panel and FSH LH and estradiol and a bone density  We will see her back in 4 months when she is on some sort of prevention and when she decides whether or not she is going to do radiation    I spent 45 total minutes, face-to-face, caring for Chante today. Greater than 50% of this time involved counseling and/or coordination of care as documented within this note.

## 2024-10-16 ENCOUNTER — LAB (OUTPATIENT)
Dept: LAB | Facility: HOSPITAL | Age: 54
End: 2024-10-16
Payer: COMMERCIAL

## 2024-10-16 ENCOUNTER — CONSULT (OUTPATIENT)
Dept: ONCOLOGY | Facility: CLINIC | Age: 54
End: 2024-10-16
Payer: COMMERCIAL

## 2024-10-16 VITALS
OXYGEN SATURATION: 96 % | HEIGHT: 67 IN | BODY MASS INDEX: 33.67 KG/M2 | HEART RATE: 65 BPM | SYSTOLIC BLOOD PRESSURE: 120 MMHG | DIASTOLIC BLOOD PRESSURE: 80 MMHG | WEIGHT: 214.5 LBS | TEMPERATURE: 98 F

## 2024-10-16 DIAGNOSIS — C50.919 MALIGNANT NEOPLASM OF FEMALE BREAST, UNSPECIFIED ESTROGEN RECEPTOR STATUS, UNSPECIFIED LATERALITY, UNSPECIFIED SITE OF BREAST: Primary | ICD-10-CM

## 2024-10-16 DIAGNOSIS — C50.912 NEOPLASM OF LEFT BREAST, REGIONAL LYMPH NODE STAGING CATEGORY PN0 PER AMERICAN JOINT COMMITTEE ON CANCER STAGING GUIDELINES, 7TH EDITION: ICD-10-CM

## 2024-10-16 DIAGNOSIS — C50.912 NEOPLASM OF LEFT BREAST, REGIONAL LYMPH NODE STAGING CATEGORY PN0 PER AMERICAN JOINT COMMITTEE ON CANCER STAGING GUIDELINES, 7TH EDITION: Primary | ICD-10-CM

## 2024-10-16 LAB
BASOPHILS # BLD AUTO: 0.05 10*3/MM3 (ref 0–0.2)
BASOPHILS NFR BLD AUTO: 0.7 % (ref 0–1.5)
DEPRECATED RDW RBC AUTO: 41.7 FL (ref 37–54)
EOSINOPHIL # BLD AUTO: 0.19 10*3/MM3 (ref 0–0.4)
EOSINOPHIL NFR BLD AUTO: 2.7 % (ref 0.3–6.2)
ERYTHROCYTE [DISTWIDTH] IN BLOOD BY AUTOMATED COUNT: 12 % (ref 12.3–15.4)
ESTRADIOL SERPL HS-MCNC: 27.3 PG/ML
FSH SERPL-ACNC: 80.7 MIU/ML
HCT VFR BLD AUTO: 39.2 % (ref 34–46.6)
HGB BLD-MCNC: 13.6 G/DL (ref 12–15.9)
IMM GRANULOCYTES # BLD AUTO: 0.02 10*3/MM3 (ref 0–0.05)
IMM GRANULOCYTES NFR BLD AUTO: 0.3 % (ref 0–0.5)
LH SERPL-ACNC: 34.7 MIU/ML
LYMPHOCYTES # BLD AUTO: 2.29 10*3/MM3 (ref 0.7–3.1)
LYMPHOCYTES NFR BLD AUTO: 32.4 % (ref 19.6–45.3)
MCH RBC QN AUTO: 32.8 PG (ref 26.6–33)
MCHC RBC AUTO-ENTMCNC: 34.7 G/DL (ref 31.5–35.7)
MCV RBC AUTO: 94.5 FL (ref 79–97)
MONOCYTES # BLD AUTO: 0.79 10*3/MM3 (ref 0.1–0.9)
MONOCYTES NFR BLD AUTO: 11.2 % (ref 5–12)
NEUTROPHILS NFR BLD AUTO: 3.72 10*3/MM3 (ref 1.7–7)
NEUTROPHILS NFR BLD AUTO: 52.7 % (ref 42.7–76)
NRBC BLD AUTO-RTO: 0 /100 WBC (ref 0–0.2)
PLATELET # BLD AUTO: 222 10*3/MM3 (ref 140–450)
PMV BLD AUTO: 9.5 FL (ref 6–12)
RBC # BLD AUTO: 4.15 10*6/MM3 (ref 3.77–5.28)
WBC NRBC COR # BLD AUTO: 7.06 10*3/MM3 (ref 3.4–10.8)

## 2024-10-16 PROCEDURE — 83001 ASSAY OF GONADOTROPIN (FSH): CPT | Performed by: INTERNAL MEDICINE

## 2024-10-16 PROCEDURE — 82670 ASSAY OF TOTAL ESTRADIOL: CPT | Performed by: INTERNAL MEDICINE

## 2024-10-16 PROCEDURE — 36415 COLL VENOUS BLD VENIPUNCTURE: CPT

## 2024-10-16 PROCEDURE — 83002 ASSAY OF GONADOTROPIN (LH): CPT | Performed by: INTERNAL MEDICINE

## 2024-10-16 PROCEDURE — 85025 COMPLETE CBC W/AUTO DIFF WBC: CPT

## 2024-10-22 ENCOUNTER — PATIENT OUTREACH (OUTPATIENT)
Dept: OTHER | Facility: HOSPITAL | Age: 54
End: 2024-10-22
Payer: COMMERCIAL

## 2024-10-22 NOTE — PROGRESS NOTES
Called Ms. Harrell to see how she was doing. She stated she is doing well post op and is weighing her options with radiation and hormone blocking medication. We discussed her questions and she has no resource needs at this time. She was thankful for the call and will reach out if any questions or needs arise.

## 2024-10-23 LAB — REF LAB TEST RESULTS: NORMAL

## 2024-12-06 ENCOUNTER — PATIENT OUTREACH (OUTPATIENT)
Dept: OTHER | Facility: HOSPITAL | Age: 54
End: 2024-12-06
Payer: COMMERCIAL

## 2024-12-06 NOTE — PROGRESS NOTES
Called Ms. Harrell to see how she was doing. She stated she is doing well and has no needs at this time. We discussed our survivorship clinic and she was interested in more information. That has been mailed. She was thankful for the call and will reach out if any questions or needs arise.

## 2025-07-11 ENCOUNTER — OFFICE VISIT (OUTPATIENT)
Dept: OBSTETRICS AND GYNECOLOGY | Facility: CLINIC | Age: 55
End: 2025-07-11
Payer: COMMERCIAL

## 2025-07-11 VITALS
DIASTOLIC BLOOD PRESSURE: 88 MMHG | HEIGHT: 67 IN | BODY MASS INDEX: 34.69 KG/M2 | WEIGHT: 221 LBS | SYSTOLIC BLOOD PRESSURE: 146 MMHG

## 2025-07-11 DIAGNOSIS — Z85.3 PERSONAL HISTORY OF BREAST CANCER: ICD-10-CM

## 2025-07-11 DIAGNOSIS — Z01.419 VISIT FOR GYNECOLOGIC EXAMINATION: Primary | ICD-10-CM

## 2025-07-11 PROCEDURE — 99396 PREV VISIT EST AGE 40-64: CPT | Performed by: OBSTETRICS & GYNECOLOGY

## 2025-07-11 RX ORDER — TRAZODONE HYDROCHLORIDE 50 MG/1
TABLET ORAL
COMMUNITY
Start: 2025-04-01

## 2025-07-12 NOTE — PROGRESS NOTES
[facility]  [address], [address]  [place], [place] [address]  Phone: [phone] / Fax:  [phone]      2025    [address] [place] [id number]    Markert, Nicolas SUNG MD    Chief Complaint   Patient presents with    Gynecologic Exam     Annual Exam, last pap 6-10-24-NL HPV (+), Colpo 24-ECC-nl.. Mammogram, Breast Lumpectomy with reconstruction 24. Colonoscopy 2010-WNL per patient. Cologuard 20-Negative. Patient states she sees oncology next week and they will schedule her colonoscopy.     Chante Harrell is here for annual gynecologic exam.  HPI - Patient with normal pap one year ago.  She had a pap with HPV last year with negative cytology.  Colposcopy was normal.  She has a personal history of breast cancer with mastectomy.  She elected for surveillance rather than treatment.  She is seeing her breast surgeon next week and also plans to arrange colonoscopy with her.    Past Medical History:   Diagnosis Date    Allergic rhinitis     Anxiety     TX'D WITH LEXAPRO    Chronic fatigue     Depression     Goiter 2016    Group B streptococcal infection during pregnancy     Herniated disc, cervical     History of COVID-19     HPV (human papilloma virus) infection 06/10/2024    Hypertension     Infectious mononucleosis     Malignant neoplasm of breast     Migraines     OA (osteoarthritis)     Onychomycosis     JESSE on CPAP     Perirectal abscess 2018    cont to drain and will have 2nd surgery 18    Post partum depression     Rectal fistula 2018    continues to drain and will now have 2nd surgery for it 18    SAB (spontaneous ) 2001     A1, MISSED  AT 5 WEEKS    Spinal stenosis        Past Surgical History:   Procedure Laterality Date    BREAST BIOPSY Left     BREAST BIOPSY Right 2024    BREAST LUMPECTOMY WITH SENTINEL NODE BIOPSY Bilateral 2024    Procedure: Right breast wire localized excisional biopsy, left breast  wire localized lumpectomy with left sentinel lymph node biopsy;  Surgeon: Krystyna Rea MD;  Location:  [facility];  Service: General;  Laterality: Bilateral;    BREAST RECONSTRUCTION Bilateral 9/12/2024    Procedure: BILATERAL VERTICAL MASTOPEXY;  Surgeon: Marco Tran MD;  Location:  [facility];  Service: Plastics;  Laterality: Bilateral;    COLONOSCOPY N/A 01/2010    WNL PER PT    COLPOSCOPY  07/08/2024    CYST REMOVAL N/A     ON ELBOW AND FACE    FRACTURE SURGERY N/A 1974    BROKEN COLLAR BONE    INCISION AND DRAINAGE PERIRECTAL ABSCESS N/A 07/27/2018    Procedure: INCISION AND DRAINAGE OF PERIRECTAL ABSCESS;  Surgeon: Stacie Fitzgerald MD;  Location: [facility] OR;  Service: General    INTRAUTERINE DEVICE INSERTION N/A 04/17/2018    MIRENA, EXPIRES 11/17/2019, DR. MIGUELITO ZELAYA    RECTAL FISTULOTOMY N/A 06/18/2018    Procedure: Incision and Drainage of Perirectal Abscess with drain placement;  Surgeon: Stacie Fitzgerald MD;  Location: [facility] OR;  Service: General    RECTAL FISTULOTOMY N/A 07/27/2018    Procedure: RECTAL FISTULOTOMY;  Surgeon: Stacie Fitzgerald MD;  Location: [facility] OR;  Service: General    TONSILLECTOMY AND ADENOIDECTOMY Bilateral     DURING CHILDHOOD    WISDOM TOOTH EXTRACTION Bilateral 1986       Allergies   Allergen Reactions    Penicillins Anaphylaxis and Other (See Comments)     Beta lactam allergy details  Antibiotic reaction: other (SWELLING)  Age at reaction: child  Dose to reaction time: unknown  Reason for antibiotic: unknown  Epinephrine required for reaction?: unknown  Tolerated antibiotics: unknown       Adhesive Tape Rash     Skin irritation and blisters    Sulfa Antibiotics Rash       Social History     Socioeconomic History    Marital status:    Tobacco Use    Smoking status: Never    Smokeless tobacco: Never   Vaping Use    Vaping status: Never Used   Substance and Sexual Activity    Alcohol use: Yes     Alcohol/week: 4.0 standard drinks of  "alcohol     Types: 4 Glasses of wine per week    Drug use: No     Comment: 5-6 DRINKS A WEEK ON AVERAGE    Sexual activity: Not Currently     Partners: Male       Family History   Problem Relation Age of Onset    Heart disease Mother     Hypertension Mother     Skin cancer Mother     Cancer Father     Diabetes Father     Heart disease Father     Hypertension Father     Kidney disease Father     Prostate cancer Father     [place]'s disease Sister     Diabetes Sister     Hypertension Sister     Thyroid disease Sister     No Known Problems Son     No Known Problems Son     Cervical cancer Maternal Aunt     Stroke Maternal Grandmother     Lung cancer Maternal Grandmother     Cervical cancer Maternal Grandmother     Stroke Maternal Grandfather     Stroke Paternal Grandmother     Skin cancer Paternal Grandmother     Diabetes Paternal Grandfather     Spina bifida Other     Miscarriages / Stillbirths Other     Malig Hyperthermia Neg Hx     Breast cancer Neg Hx     Colon cancer Neg Hx        No LMP recorded (lmp unknown). Patient is postmenopausal.    OB History          5    Para   2    Term   2            AB   1    Living             SAB   1    IAB        Ectopic        Molar        Multiple        Live Births   2                Vitals:    25 1112   BP: 146/88   Weight: 100 kg (221 lb)   Height: 170.2 cm (67.01\")       Physical Exam  Constitutional:       Appearance: Normal appearance. She is well-developed.   Genitourinary:      Genitourinary Comments: Scars consistent with mastectomy/reconstruction.      Right Labia: No tenderness or lesions.     Left Labia: No tenderness or lesions.     No vaginal discharge or tenderness.        Right Adnexa: not tender and not full.     Left Adnexa: not tender and not full.     No cervical motion tenderness or lesion.      Uterus is not enlarged or tender.   Breasts:     Right: No mass or nipple discharge.      Left: No mass or nipple discharge.   HENT:      Right " Ear: External ear normal.      Left Ear: External ear normal.      Nose: Nose normal.   Eyes:      Conjunctiva/sclera: Conjunctivae normal.   Neck:      Thyroid: No thyromegaly.   Cardiovascular:      Rate and Rhythm: Normal rate and regular rhythm.      Heart sounds: Normal heart sounds.   Pulmonary:      Effort: Pulmonary effort is normal.      Breath sounds: No stridor. No wheezing.   Abdominal:      Palpations: Abdomen is soft.      Tenderness: There is no abdominal tenderness. There is no guarding or rebound.   Musculoskeletal:         General: Normal range of motion.      Cervical back: Normal range of motion and neck supple.   Neurological:      Mental Status: She is alert.      Coordination: Coordination normal.   Skin:     General: Skin is warm and dry.   Psychiatric:         Mood and Affect: Mood normal.         Behavior: Behavior normal.         Thought Content: Thought content normal.         Judgment: Judgment normal.   Vitals reviewed. Exam conducted with a chaperone present.         Diagnoses and all orders for this visit:    1. Visit for gynecologic examination (Primary)  -     IgP, Aptima HPV  -     Pap repeated due to HPV abnormality.  Await results.  -     Discussed importance of regular screening.  Encouraged to follow up with colonoscopy.    2. Personal history of breast cancer        -      Surveillance per Dr Rea.      Simon Pearson MD

## 2025-07-15 ENCOUNTER — RESULTS FOLLOW-UP (OUTPATIENT)
Dept: OBSTETRICS AND GYNECOLOGY | Facility: CLINIC | Age: 55
End: 2025-07-15
Payer: COMMERCIAL

## 2025-07-15 LAB
CYTOLOGIST CVX/VAG CYTO: NORMAL
CYTOLOGY CVX/VAG DOC CYTO: NORMAL
CYTOLOGY CVX/VAG DOC THIN PREP: NORMAL
DX ICD CODE: NORMAL
HPV I/H RISK 4 DNA CVX QL PROBE+SIG AMP: NEGATIVE
OTHER STN SPEC: NORMAL
SERVICE CMNT-IMP: NORMAL
STAT OF ADQ CVX/VAG CYTO-IMP: NORMAL

## 2025-07-18 ENCOUNTER — OFFICE VISIT (OUTPATIENT)
Dept: SURGERY | Facility: CLINIC | Age: 55
End: 2025-07-18
Payer: COMMERCIAL

## 2025-07-18 VITALS
BODY MASS INDEX: 34.88 KG/M2 | HEART RATE: 67 BPM | DIASTOLIC BLOOD PRESSURE: 82 MMHG | SYSTOLIC BLOOD PRESSURE: 136 MMHG | HEIGHT: 67 IN | WEIGHT: 222.2 LBS | OXYGEN SATURATION: 95 %

## 2025-07-18 DIAGNOSIS — Z85.3 HISTORY OF BREAST CANCER: Primary | ICD-10-CM

## 2025-07-18 DIAGNOSIS — Z12.11 SCREENING FOR COLON CANCER: ICD-10-CM

## 2025-07-18 DIAGNOSIS — Z12.31 ENCOUNTER FOR SCREENING MAMMOGRAM FOR MALIGNANT NEOPLASM OF BREAST: ICD-10-CM

## 2025-07-18 PROCEDURE — 99213 OFFICE O/P EST LOW 20 MIN: CPT | Performed by: STUDENT IN AN ORGANIZED HEALTH CARE EDUCATION/TRAINING PROGRAM

## 2025-07-18 NOTE — PROGRESS NOTES
General Surgery Breast Cancer History and Physical Exam      Summary:    Chante Harrell is a 54 y.o. lady who presents with a history of left breast invasive ductal carcinoma: Grade I, Ki-67 13%, ER+/KY+, Her2-; [id number], Stage I.       A multidisciplinary plan has been formulated for the patient:    (1) Breast Surgical Oncology:  -No indication for preoperative [employer] 9 panel genetic testing.   -S/p left breast wire localized lumpectomy with sentinel lymph node biopsy, right breast wire localized excisional biopsy with oncoplastic closure by Dr. Tran 9/12/2024.  -Lymphedema clinic referral.  -Annual mammogram due 6/2025.  Orders placed for this today.  -Follow up with me 7/2025.      (2) Medical Oncology:  -Seen by Dr. Baker. Declined endocrine therapy.      (3) Radiation Oncology:  -Declined radiation therapy.      (4) Screening for colon cancer:  -Due for colonoscopy.  She will call when she would like to schedule.     Referring Provider: No ref. provider found     Chief Complaint: abnormal breast imaging     History of Present Illness: Ms. Chante Harrell is a 54 y.o. year old lady, seen at the request of No ref. provider found for a new diagnosis of left breast cancer.       This was initially detected as an imaging abnormality.  This was her first mammogram since before COVID.  She had no prior breast biopsy.  Her work-up is detailed in the oncologic history below.      She denies any breast lumps, pain, skin changes, or nipple discharge. She denies any family history of breast or ovarian cancer.     9/23/2024 she presents today for follow-up.  She is overall done well since surgery.  Her pain is controlled and she is getting back to her daily activities.  She has developed a moderate to large size seroma in her left axilla.    7/18/2025 she presents today for follow-up.  She is done well since I last saw her.  She denies any complaints with her breast exam.  She denies masses or skin changes.  She has not  had a mammogram yet this year.  She declined endocrine therapy or radiation therapy.  She has not had a mammogram yet this year.  Orders placed for 1 today.  She is up-to-date with her primary care doctor.  She is due for colonoscopy.    Workup of Current Diagnosis:    6/11/2024 bilateral Screening Mammogram:  There are scattered areas of fibroglandular density.  There is an isodense asymmetry measuring 4 mm in the middle one third medial region of the left breast.  Requires additional evaluation.  BI-RADS Category 0     6/26/2024 left breast diagnostic Mammogram with Ultrasound:   There are scattered areas of fibroglandular density.  On present exam there is an isodense asymmetry measuring 4 mm in the middle one third region of the left breast at 6:00 5 cm from the nipple.  Ultrasound demonstrates a round hypoechoic mass with partially defined margins measuring 4 mm in the middle one third region of the left breast at 6:00 5 cm from the nipple.  Suspicious.  Ultrasound-guided biopsy is recommended.  BI-RADS Category 4B     7/17/2024 left breast ultrasound-guided biopsy:   Left breast at 6:00 was imaged and the mass was localized.  13 cores were obtained.  A mini cork tissue marker was placed.  Clip was in the expected position.  Pathology returned as invasive ductal carcinoma and DCIS.     7/17/2024 Pathology:   Final Diagnosis   1. Left breast, 6:00, ultrasound-guided core needle biopsy (mini cork clip):               A. Invasive ductal carcinoma, well-differentiated; Nick histologic grade I/III        (tubule score = 2, nuclear score = 2, mitoses score = 1), measuring at least 3 mm.                B. 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               C. Negative for lymphovascular space invasion.   D. See biomarker template.      8/15/2024 Bilateral Breast MRI   IMPRESSION AND RECOMMENDATION:    1.  Non-mass enhancement measuring 2.2 cm at  6:00 in the left breast represents the site of biopsy-proven malignancy. Recommend surgical management.  2.  Suspicious 1 cm non-mass enhancement in the subareolar right breast with a suspected mammographic correlate. Recommend diagnostic mammogram, including spot compression views, and possible targeted ultrasound. This  should be followed with stereotactic core needle biopsy or ultrasound-guided core needle biopsy versus MRI guided core needle  biopsy if no confident mammographic or sonographic correlate is identified.  An epic inbox message sent to Dr. Rea on 8/15/2024.   BI-RADS Category 4: Suspicious     8/21/2024 Right Breast Diagnostic Mammo/US:   CONCLUSION: Suspicious right-sided mammogram and directed right breast ultrasound showing a lesion in retroareolar right breast corresponding to the suspicious lesion noted on the breast MRI scan dated 08/21/2024. Further evaluation with ultrasound-guided core mammotome biopsy and clip placement is recommended. These findings have been discussed with the  patient. BI-RADS 4. Suspicious abnormality or suspicious finding.     9/4/2024 Right Breast US Guided Biopsy:   IMPRESSION:  1. Ultrasound-guided core needle biopsy of a mass at 1:00 in the retroareolar right breast corresponding to an area of suspicious  enhancement on recent breast MRI, marked with a bowtie clip. Pathology demonstrates intraductal papilloma, which is benign and concordant with the imaging assessment. Recommend surgical management.  2. Surgical management is again recommended for biopsy proven left breast malignancy.     9/4/2024 Pathology:   Final Diagnosis   1.  Breast, right retroareolar 1 o'clock position, core biopsy:  (bow clip)               A.  Fragments of nonintact sclerosed intraductal papilloma, measuring up to 4 mm maximally and involving 3 core fragments.       9/12/2024 left breast wire localized lumpectomy with sentinel lymph node biopsy, right breast wire localized excisional  biopsy   Final Diagnosis   1.   Right breast, excisional biopsy:         A.  Benign breast parenchyma.         B.  Features suggestive of biopsy site change.         C.  Negative for atypical hyperplasia or carcinoma.     2.  Right breast, additional lateral margin (inked and oriented), excision:         A.  Benign breast parenchyma.         B.  Negative for atypical hyperplasia or carcinoma.     3. Right breast, additional superior margin (inked and oriented), excision:         A.  Benign breast parenchyma.         B.  Negative for atypical hyperplasia or carcinoma.     4.  Right breast, additional anterior margin (inked and oriented), excision:         A.  Sclerosed intraductal papilloma with associated usual ductal hyperplasia, completely excised.         B.  Biopsy site changes present.         C.  Negative for atypical hyperplasia or carcinoma.     5. Right breast, additional medial margin (inked and oriented), excision:         A.  Benign breast parenchyma.         B.  Negative for atypical hyperplasia or carcinoma.     6.  Left breast, partial mastectomy:                A. Invasive ductal carcinoma, moderately-differentiated; Nick histologic grade II/III                    (tubular score = 3, nuclear score = 3 , mitoses score = 1):                 1. Tumor size: 3 mm (measured on slide 6D)                 2. Margins negative for invasive carcinoma      Closest distance: invasive carcinoma is present 3 mm from medial margin      (Superseded by specimen #12, see synoptic report for final margin status)                 3. Negative for lymphovascular space invasion         B.  Intermediate-grade ductal carcinoma in situ (DCIS)                 1.  Solid and cribriform types with associated central necrosis.                 2.  Extent of DCIS: 2.5 mm (measured on slide 6D)                 3. Margins negative for in situ carcinoma        Closest distance: DCIS is present 1.8 mm from medial margin        (Superseded by specimen #12, see synoptic report for final margin status)         C.  Intraductal papillomas with associated usual ductal hyperplasia.         D.  Clip and biopsy site adjacent to carcinoma         E.  See synoptic report     7.  Left breast, additional superior margin (inked and oriented), excision:         A.  Separate focus of atypical ductal hyperplasia         B.  Intraductal papilloma         C. Additional 12 mm free of DCIS and carcinoma     8. Left breast, additional anterior margin (inked and oriented), excision:         A.  Benign breast parenchyma with intraductal papilloma (additional 12 mm free of carcinoma)     9. Left breast, additional posterior margin (inked and oriented), excision:         A.  Benign breast parenchyma (additional 12 mm free of carcinoma)     10. Left breast, additional inferior margin (inked and oriented), excision:         A.  Benign breast parenchyma (additional 7 mm free of carcinoma)     11.   Left breast, additional lateral margin (inked and oriented), excision:         A.  Benign breast parenchyma (additional 12 mm free of carcinoma)     12. Left breast, additional medial margin (inked and oriented), excision:         A.  Benign breast parenchyma (additional 15 mm free of carcinoma)     13.  Left axillary sentinel lymph node #1, excision         A.  One lymph node, negative for metastatic carcinoma (0/1).     14.  Left axillary sentinel lymph node #2, excision         A. Two lymph nodes, negative for metastatic carcinoma (0/2).     15. Left axillary sentinel lymph node #3, excision         A. One lymph node, negative for metastatic carcinoma (0/1).       Gynecologic History:   G:3. P:2 AB:1  Age at first childbirth: 32  Lactation/How long: none  Age at menarche: 13  Age at menopause: unsure  Total years of oral contraceptive use: 26 years previously  Total years of hormone replacement therapy: none     Past Medical History:   HTN  OA     Past Surgical History:     [place] tooth extraction   Tonsillectomy/adenoidectomy  I&D perirectal abscess, Rectal fistulotomy  IUD     Family History:    As above     Social History:  Denies tobacco use  Occasional alcohol use     Allergies:   Allergies             Allergies   Allergen Reactions    Penicillins Anaphylaxis and Other (See Comments)       AS A KID    Adhesive Tape Rash       Skin irritation and blisters    Sulfa Antibiotics Rash and Other (See Comments)         Medications:      Current Medications      Current Outpatient Medications:     amLODIPine (NORVASC) 5 MG tablet, Take 1 tablet by mouth Daily., Disp: , Rfl:     ibuprofen (ADVIL,MOTRIN) 200 MG tablet, Take 1 tablet by mouth Every 6 (Six) Hours As Needed for Mild Pain., Disp: , Rfl:     lisinopril-hydrochlorothiazide (PRINZIDE,ZESTORETIC) 20-12.5 MG per tablet, take 1 tablet by mouth every day for 30 days, Disp: , Rfl:     metoprolol succinate XL (TOPROL-XL) 50 MG 24 hr tablet, Take 1 tablet by mouth Daily., Disp: , Rfl:     PARoxetine (PAXIL) 20 MG tablet, Take 1 tablet by mouth Every Morning., Disp: , Rfl:        Laboratory Values:    Labs from 10/16/2024 reviewed by me      Review of Systems:   Influenza-like illness: no fever, no  cough, no  sore throat, no  body aches, no loss of sense of taste or smell, no known exposure to person with Covid-19.  Constitutional: Negative for fevers or chills  HENT: Negative for hearing loss or runny nose  Eyes: Negative for vision changes or scleral icterus  Respiratory: Negative for cough or shortness of breath  Cardiovascular: Negative for chest pain or heart palpitations  Gastrointestinal: Negative for abdominal pain, nausea, vomiting, constipation, melena, or hematochezia  Genitourinary: Negative for hematuria or dysuria  Musculoskeletal: Negative for joint swelling or gait instability  Neurologic: Negative for tremors or seizures  Psychiatric: Negative for suicidal ideations or depression  All other systems reviewed and  negative     Physical Exam:   ECO - Asymptomatic  Constitutional: Well-developed well-nourished, no acute distress  Eyes: Conjunctiva normal, sclera nonicteric  ENMT: Hearing grossly normal, oral mucosa moist  Neck: Supple, no palpable mass, trachea midline  Respiratory: Clear to auscultation, normal inspiratory effort  Cardiovascular: Regular rate, no peripheral edema, no jugular venous distention  Breast: symmetric, bilateral breast incisions clean and dry, left axillary seroma  Right: No visible abnormalities on inspection while seated, with arms raised or hands on hips. No masses, skin changes, or nipple abnormalities.  Left: No visible abnormalities on inspection while seated, with arms raised or hands on hips. No masses, skin changes, or nipple abnormalities.  Left breast incision well-healed with no masses or skin changes  No clinical chest wall involvement.  Gastrointestinal: Soft, nontender  Lymphatics (palpable nodes): No cervical, supraclavicular or axillary lymphadenopathy  Skin:  Warm, dry, no rash on visualized skin surfaces  Musculoskeletal: Symmetric strength, normal gait  Psychiatric: Alert and oriented ×3, normal affect       DEVORAH VARGAS M.D.  General and Endoscopic Surgery  [facility]     [address], [address]  [place], [place], [address]  P: [phone]  F: [phone]

## 2025-07-28 ENCOUNTER — PREP FOR SURGERY (OUTPATIENT)
Dept: OTHER | Facility: HOSPITAL | Age: 55
End: 2025-07-28
Payer: COMMERCIAL

## 2025-07-28 ENCOUNTER — APPOINTMENT (OUTPATIENT)
Dept: WOMENS IMAGING | Facility: HOSPITAL | Age: 55
End: 2025-07-28
Payer: COMMERCIAL

## 2025-07-28 DIAGNOSIS — Z12.11 SCREENING FOR COLON CANCER: Primary | ICD-10-CM

## 2025-07-28 PROCEDURE — 77063 BREAST TOMOSYNTHESIS BI: CPT | Performed by: RADIOLOGY

## 2025-07-28 PROCEDURE — 77067 SCR MAMMO BI INCL CAD: CPT | Performed by: RADIOLOGY

## (undated) DEVICE — ANTIBACTERIAL UNDYED BRAIDED (POLYGLACTIN 910), SYNTHETIC ABSORBABLE SUTURE: Brand: COATED VICRYL

## (undated) DEVICE — SUT PDS 3/0 SH 27IN DYED Z316H

## (undated) DEVICE — PATIENT RETURN ELECTRODE, SINGLE-USE, CONTACT QUALITY MONITORING, ADULT, WITH 9FT CORD, FOR PATIENTS WEIGING OVER 33LBS. (15KG): Brand: MEGADYNE

## (undated) DEVICE — IRRIGATOR BULB ASEPTO 60CC STRL

## (undated) DEVICE — PK PROC MAJ 40

## (undated) DEVICE — GLV SURG SENSICARE PI PF LF 7 GRN STRL

## (undated) DEVICE — SUT SILK 3/0 SH CR5 18IN C0135

## (undated) DEVICE — NDL HYPO ECLPS SFTY 22G 1 1/2IN

## (undated) DEVICE — APPL CHLORAPREP W/TINT 26ML ORNG

## (undated) DEVICE — GLV SURG BIOGEL M LTX PF 6 1/2

## (undated) DEVICE — SUT VIC 0 TIES 18IN J912G

## (undated) DEVICE — SPNG GZ WOVN 4X4IN 12PLY 10/BX STRL

## (undated) DEVICE — DRAPE,UNDERBUTTOCKS,PCH,STERILE: Brand: MEDLINE

## (undated) DEVICE — SUT VIC 0 CT1 36IN J946H

## (undated) DEVICE — SUT SILK 2/0 TIES 18IN A185H

## (undated) DEVICE — SUT MNCRYL PLS ANTIB UD 4/0 PS2 18IN

## (undated) DEVICE — APPL CHLORAPREP HI/LITE 26ML ORNG

## (undated) DEVICE — INTENDED FOR TISSUE SEPARATION, AND OTHER PROCEDURES THAT REQUIRE A SHARP SURGICAL BLADE TO PUNCTURE OR CUT.: Brand: BARD-PARKER ® CARBON RIB-BACK BLADES

## (undated) DEVICE — SUT PDS 1 XLH LP 99IN Z881G

## (undated) DEVICE — GOWN SURG AERO CHROME XL

## (undated) DEVICE — STERILE LATEX POWDER-FREE SURGICAL GLOVESWITH NITRILE COATING: Brand: PROTEXIS

## (undated) DEVICE — GOWN,NON-REINFORCED,SIRUS,SET IN SLV,XL: Brand: MEDLINE

## (undated) DEVICE — PROXIMATE PLUS MD MULTI-DIRECTIONAL RELEASE SKIN STAPLERS CONTAINS 35 STAINLESS STEEL STAPLES APPROXIMATE CLOSED DIMENSIONS: 6.9MM X 3.9MM WIDE: Brand: PROXIMATE

## (undated) DEVICE — NDL HYPO PRECISIONGLIDE REG 25G 1 1/2

## (undated) DEVICE — SYR LL TP 10ML STRL

## (undated) DEVICE — BNDG,ELSTC,MATRIX,STRL,6"X5YD,LF,HOOK&LP: Brand: MEDLINE

## (undated) DEVICE — LEGGINGS, PAIR, CLEAR, STERILE: Brand: MEDLINE

## (undated) DEVICE — PREP SOL POVIDONE/IODINE BT 4OZ

## (undated) DEVICE — SUT VIC 0 CT1 CR8 27IN JJ41G

## (undated) DEVICE — DRAPE,UTILITY,TAPE,15X26,STERILE: Brand: MEDLINE

## (undated) DEVICE — GLV SURG SENSICARE PI LF PF 7.5 GRN STRL

## (undated) DEVICE — BANDAGE,GAUZE,BULKEE II,4.5"X4.1YD,STRL: Brand: MEDLINE

## (undated) DEVICE — GLV SURG SENSICARE ALOE LF PF SZ7.5 GRN

## (undated) DEVICE — DRSNG WND GZ PAD BORDERED 4X8IN STRL

## (undated) DEVICE — PENCL SMOKE/EVAC MEGADYNE TELESCP 10FT

## (undated) DEVICE — SUT VIC 4/0 SH 27IN J415H

## (undated) DEVICE — GLV SURG BIOGEL LTX PF 7

## (undated) DEVICE — LOU MINOR PROCEDURE: Brand: MEDLINE INDUSTRIES, INC.

## (undated) DEVICE — MEDI-VAC YANKAUER SUCTION HANDLE W/BULBOUS TIP: Brand: CARDINAL HEALTH

## (undated) DEVICE — SYS CLS SKIN PREMIERPRO EXOFINFUSION 22CM

## (undated) DEVICE — 3M™ IOBAN™ 2 ANTIMICROBIAL INCISE DRAPE 6648EZ: Brand: IOBAN™ 2

## (undated) DEVICE — GLV SURG BIOGEL LTX PF 6 1/2

## (undated) DEVICE — ELECTRD BLD EXT EDGE 1P COAT 6.5IN STRL

## (undated) DEVICE — SPNG LAP 18X18IN LF STRL PK/5

## (undated) DEVICE — CONTAINER,SPECIMEN,OR STERILE,4OZ: Brand: MEDLINE

## (undated) DEVICE — DRN PENRS 1/4X18IN LTX

## (undated) DEVICE — PANTY KNIT MATERN L/XL

## (undated) DEVICE — DRAPE,CHEST,FENES,15X10,STERIL: Brand: MEDLINE

## (undated) DEVICE — GLV SURG SENSICARE GREEN W/ALOE PF LF 7 STRL

## (undated) DEVICE — STANDARD HYPODERMIC NEEDLE,POLYPROPYLENE HUB: Brand: MONOJECT

## (undated) DEVICE — ELECTRD BLD EZ CLN MOD 2.5IN

## (undated) DEVICE — PENCL E/S HNDSWCH ROCKR CB

## (undated) DEVICE — EXTRCT STPL SKIN STRL BX/12

## (undated) DEVICE — COVER,MAYO STAND,STERILE: Brand: MEDLINE

## (undated) DEVICE — SUT SILK 2/0 FS BLK 18IN 685G

## (undated) DEVICE — Device

## (undated) DEVICE — VIOLET BRAIDED (POLYGLACTIN 910), SYNTHETIC ABSORBABLE SUTURE: Brand: COATED VICRYL

## (undated) DEVICE — STCKNT IMPERV 9X36IN STRL

## (undated) DEVICE — STPLR SKIN VISISTAT WD 35CT

## (undated) DEVICE — TRAP FLD MINIVAC MEGADYNE 100ML

## (undated) DEVICE — TOWEL,OR,DSP,ST,BLUE,STD,4/PK,20PK/CS: Brand: MEDLINE

## (undated) DEVICE — CATH IV INSYTE AUTOGARD 14G 1 1/2IN ORNG

## (undated) DEVICE — TTL1LYR 16FR10ML 100%SIL TMPST TR: Brand: MEDLINE

## (undated) DEVICE — PROXIMATE RH ROTATING HEAD SKIN STAPLERS (35 WIDE) CONTAINS 35 STAINLESS STEEL STAPLES: Brand: PROXIMATE

## (undated) DEVICE — ADHS SKIN SURG TISS VISC PREMIERPRO EXOFIN HI/VISC FAST/DRY

## (undated) DEVICE — PK PROC MINOR TOWER 40